# Patient Record
Sex: MALE | Race: WHITE | NOT HISPANIC OR LATINO | Employment: FULL TIME | URBAN - NONMETROPOLITAN AREA
[De-identification: names, ages, dates, MRNs, and addresses within clinical notes are randomized per-mention and may not be internally consistent; named-entity substitution may affect disease eponyms.]

---

## 2017-01-09 RX ORDER — AMLODIPINE BESYLATE 2.5 MG/1
2.5 TABLET ORAL DAILY
Qty: 30 TABLET | Refills: 3 | Status: SHIPPED | OUTPATIENT
Start: 2017-01-09 | End: 2017-10-26 | Stop reason: SDUPTHER

## 2017-01-09 NOTE — TELEPHONE ENCOUNTER
[facility] pharmacy phoned stating that Margarette Abdi  Had left town.  She has been patients PCP.  The pharmacy was needing a refill on pts. Amlodipine 2.5 mg.  This was approved with 3 refills.

## 2017-03-01 ENCOUNTER — ANTICOAGULATION VISIT (OUTPATIENT)
Dept: CARDIOLOGY | Facility: CLINIC | Age: 52
End: 2017-03-01

## 2017-03-01 LAB — INR PPP: 1.8

## 2017-03-01 NOTE — PROGRESS NOTES
DX I26.99 INR 1.8 new dosing regimen to pt, states is eating salads daily, for wt loss  Dosing adjusted accordingly

## 2017-03-14 NOTE — PROGRESS NOTES
I have reviewed the notes, assessments, and/or procedures performed by nurse, I concur with her/his documentation of Edgardo Ugarte.

## 2017-04-03 ENCOUNTER — OFFICE VISIT (OUTPATIENT)
Dept: CARDIOLOGY | Facility: CLINIC | Age: 52
End: 2017-04-03

## 2017-04-03 VITALS
DIASTOLIC BLOOD PRESSURE: 94 MMHG | WEIGHT: 290 LBS | BODY MASS INDEX: 36.06 KG/M2 | HEIGHT: 75 IN | HEART RATE: 88 BPM | SYSTOLIC BLOOD PRESSURE: 124 MMHG

## 2017-04-03 DIAGNOSIS — R06.00 DYSPNEA, UNSPECIFIED TYPE: ICD-10-CM

## 2017-04-03 DIAGNOSIS — I77.810 DILATED AORTIC ROOT (HCC): ICD-10-CM

## 2017-04-03 DIAGNOSIS — I10 ESSENTIAL HYPERTENSION: Primary | ICD-10-CM

## 2017-04-03 PROBLEM — I27.82 CHRONIC PULMONARY EMBOLISM (HCC): Status: ACTIVE | Noted: 2017-04-03

## 2017-04-03 PROCEDURE — 99213 OFFICE O/P EST LOW 20 MIN: CPT | Performed by: INTERNAL MEDICINE

## 2017-04-03 RX ORDER — ERGOCALCIFEROL 1.25 MG/1
50000 CAPSULE ORAL WEEKLY
COMMUNITY
End: 2017-11-09

## 2017-04-03 RX ORDER — WARFARIN SODIUM 7.5 MG/1
7.5 TABLET ORAL DAILY
COMMUNITY
End: 2017-04-03 | Stop reason: ALTCHOICE

## 2017-04-03 NOTE — PROGRESS NOTES
Kenyettapooja Qasim Ugarte  51 y.o. male    04/03/2017  1. Essential hypertension    2. Dyspnea, unspecified type    3. Dilated aortic root        History of Present Illness : Routine follow-up      Her stated years old patient who admitted to [facility] eighth of March 2015 for evaluation shortness breath and dizzy.  Patient noted to have markedly abnormal d-dimer.  Patient injured  his right leg week prior to that.  CT coronary angiogram confirmed the bilateral pulmonary embolism.  Echocardiographic study was done.  Abnormal left and a systolic function with a mildly dilated aortic root diameter 4.0 and mildly dilated left atrium.  The patient on chronic or oral anticoagulations since that.  After discussion with the patient decided to go to discontinue warfarin.  He will continue amlodipine.  Patient denies orthopnea, PND, nausea, vomiting, diarrhea.  Denies any palpitation or fluttering.  Denies any dyspnea on exertion.             SUBJECTIVE    Allergies   Allergen Reactions   • Penicillins          Past Medical History:   Diagnosis Date   • Hypertension    • Personal history of PE (pulmonary embolism)          Past Surgical History:   Procedure Laterality Date   • CHOLECYSTECTOMY           History reviewed. No pertinent family history.      Social History     Social History   • Marital status: Single     Spouse name: N/A   • Number of children: N/A   • Years of education: N/A     Occupational History   • Not on file.     Social History Main Topics   • Smoking status: Never Smoker   • Smokeless tobacco: Never Used   • Alcohol use Yes      Comment: socially   • Drug use: No   • Sexual activity: Defer     Other Topics Concern   • Not on file     Social History Narrative   • No narrative on file         Current Outpatient Prescriptions   Medication Sig Dispense Refill   • amLODIPine (NORVASC) 2.5 MG tablet Take 1 tablet by mouth Daily. 30 tablet 3   • vitamin D (ERGOCALCIFEROL) 17875 UNITS capsule capsule Take  "50,000 Units by mouth 1 (One) Time Per Week.       No current facility-administered medications for this visit.          OBJECTIVE    /94  Pulse 88  Ht 75\" (190.5 cm)  Wt 290 lb (132 kg)  BMI 36.25 kg/m2        Review of Systems     Constitutional:  Denies recent weight loss, weight gain, fever or chills, no change in exercise tolerance     HENT:  Denies any hearing loss, epistaxis, hoarseness, or difficulty speaking.     Eyes: Wears eyeglasses or contact lenses     Respiratory:  Denies dyspnea with exertion,no cough, wheezing, or hemoptysis.     Cardiovascular: Negative for palpations, chest pain, orthopnea, PND, peripheral edema, syncope, or claudication.     Gastrointestinal:  Denies change in bowel habits, dyspepsia, ulcer disease, hematochezia, or melena.     Endocrine: Negative for cold intolerance, heat intolerance, polydipsia, polyphagia and polyuria. Denies any history of weight change, heat/cold intolerance, polydipsia, polyuria     Genitourinary: Negative.      Musculoskeletal: Denies any history of arthritic symptoms or back problems     Skin:  Denies any change in hair or nails, rashes, or skin lesions.     Allergic/Immunologic: Negative.  Negative for environmental allergies, food allergies and immunocompromised state.     Neurological:  Denies any history of recurrent headaches, strokes, TIA, or seizure disorder.     Hematological: Denies any food allergies, seasonal allergies, bleeding disorders, or lymphadenopathy.     Psychiatric/Behavioral: Denies any history of depression, substance abuse, or change in cognitive function.         Physical Exam     Constitutional: Cooperative, alert and oriented, well-developed, well-nourished, in no acute distress.     HENT:   Head: Normocephalic, normal hair patterns, no masses or tenderness.  Ears, Nose, and Throat: No gross abnormalities. No pallor or cyanosis. Dentition good.   Eyes: EOMS intact, PERRL, conjunctivae and lids unremarkable. " Fundoscopic exam and visual fields not performed.   Neck: No palpable masses or adenopathy, no thyromegaly, no JVD, carotid pulses are full and equal bilaterally and without  Bruits.     Cardiovascular: Regular rhythm, S1 and S2 normal, no S3 or S4. Apical impulse not displaced. No murmurs, gallops, or rubs detected.     Pulmonary/Chest: Chest: normal symmetry, no tenderness to palpation, normal respiratory excursion, no intercostal retraction, no use of accessory muscles.            Pulmonary: Normal breath sounds. No rales or ronchi.    Abdominal: Abdomen soft, bowel sounds normoactive, no masses, no hepatosplenomegaly, non-tender, no bruits.     Musculoskeletal: No deformities, clubbing, cyanosis, erythema, or edema observed. There are no spinal abnormalities noted. Normal muscle strength and tone. Pulses full and equal in all extremities, no bruits auscultated.     Neurological: No gross motor or sensory deficits noted, affect appropriate, oriented to time, person, place.     Skin: Warm and dry to the touch, no apparent skin lesions or masses noted.     Psychiatric: He has a normal mood and affect. His behavior is normal. Judgment and thought content normal.         Procedures      Lab Results   Component Value Date    WBC 10.9 (H) 05/31/2016    HGB 15.2 05/31/2016    HCT 42.3 05/31/2016    MCV 89.1 05/31/2016     05/31/2016     Lab Results   Component Value Date    GLUCOSE 104 (H) 05/31/2016    BUN 15 05/31/2016    CREATININE 0.8 05/31/2016    CO2 22 05/31/2016    CALCIUM 9.0 05/31/2016    ALBUMIN 4.3 05/31/2016    AST 38 05/31/2016    ALT 55 05/31/2016     No results found for: CHOL  No results found for: TRIG  No results found for: HDL  No results found for: LDLCALC  No results found for: LDL  No results found for: HDLLDLRATIO  No components found for: CHOLHDL  No results found for: HGBA1C  Lab Results   Component Value Date    TSH 1.06 05/31/2016           ASSESSMENT AND PLAN  History of pulmonary  embolism March 2015 #2 hypertension #3 and mildly dilated aortic root    Clinically there is no sign of any acute cardiac decompensation based on the clinical history physical finding.  No evidence of active ischemia.  He had a pulmonary embolism in 2015 after injury to his right leg.  After discussion with the patient decided to discontinue warfarin.  He will continue amlodipine for hypertension with good blood pressure control.  Will arrange an echocardiographic study given the mildly dilated aortic root with the last echo in March 2015.  We'll see him back in this 8 month to one year R depends on patient clinical condition    Edgardo was seen today for follow-up.    Diagnoses and all orders for this visit:    Essential hypertension  -     Adult Transthoracic Echo Complete    Dyspnea, unspecified type  -     Adult Transthoracic Echo Complete    Dilated aortic root  -     Adult Transthoracic Echo Complete        Whit Little MD  4/3/2017  4:06 PM

## 2017-07-10 ENCOUNTER — HOSPITAL ENCOUNTER (INPATIENT)
Facility: HOSPITAL | Age: 52
LOS: 8 days | Discharge: HOME | End: 2017-07-18
Attending: EMERGENCY MEDICINE | Admitting: INTERNAL MEDICINE

## 2017-07-10 ENCOUNTER — APPOINTMENT (OUTPATIENT)
Dept: CT IMAGING | Facility: HOSPITAL | Age: 52
End: 2017-07-10

## 2017-07-10 ENCOUNTER — APPOINTMENT (OUTPATIENT)
Dept: ULTRASOUND IMAGING | Facility: HOSPITAL | Age: 52
End: 2017-07-10

## 2017-07-10 DIAGNOSIS — I26.99 PULMONARY EMBOLISM, BILATERAL (HCC): Primary | ICD-10-CM

## 2017-07-10 DIAGNOSIS — I82.401 DVT, RECURRENT, LOWER EXTREMITY, ACUTE, RIGHT (HCC): ICD-10-CM

## 2017-07-10 LAB
ALBUMIN SERPL-MCNC: 4.4 G/DL (ref 3.4–4.8)
ALBUMIN/GLOB SERPL: 1.2 G/DL (ref 1.1–1.8)
ALP SERPL-CCNC: 88 U/L (ref 38–126)
ALT SERPL W P-5'-P-CCNC: 47 U/L (ref 21–72)
ANION GAP SERPL CALCULATED.3IONS-SCNC: 14 MMOL/L (ref 5–15)
APTT PPP: 28.4 SECONDS (ref 20–40.3)
ARTERIAL PATENCY WRIST A: ABNORMAL
AST SERPL-CCNC: 32 U/L (ref 17–59)
ATMOSPHERIC PRESS: ABNORMAL MMHG
BASE EXCESS BLDA CALC-SCNC: -1 MMOL/L (ref -2.4–2.4)
BASOPHILS # BLD AUTO: 0.02 10*3/MM3 (ref 0–0.2)
BASOPHILS NFR BLD AUTO: 0.2 % (ref 0–2)
BDY SITE: ABNORMAL
BILIRUB SERPL-MCNC: 1.2 MG/DL (ref 0.2–1.3)
BUN BLD-MCNC: 15 MG/DL (ref 7–21)
BUN/CREAT SERPL: 16.7 (ref 7–25)
CA-I BLD-MCNC: 4.7 MG/DL (ref 4.5–4.9)
CALCIUM SPEC-SCNC: 9 MG/DL (ref 8.4–10.2)
CHLORIDE SERPL-SCNC: 101 MMOL/L (ref 95–110)
CO2 BLDA-SCNC: 23.6 MMOL/L (ref 23–27)
CO2 SERPL-SCNC: 23 MMOL/L (ref 22–31)
CREAT BLD-MCNC: 0.9 MG/DL (ref 0.7–1.3)
D-DIMER, QUANTITATIVE (MAD,POW, STR): 3804 NG/ML (FEU) (ref 0–470)
DEPRECATED RDW RBC AUTO: 40.3 FL (ref 35.1–43.9)
EOSINOPHIL # BLD AUTO: 0.12 10*3/MM3 (ref 0–0.7)
EOSINOPHIL NFR BLD AUTO: 1.1 % (ref 0–7)
ERYTHROCYTE [DISTWIDTH] IN BLOOD BY AUTOMATED COUNT: 12.1 % (ref 11.5–14.5)
GFR SERPL CREATININE-BSD FRML MDRD: 88 ML/MIN/1.73 (ref 56–130)
GLOBULIN UR ELPH-MCNC: 3.6 GM/DL (ref 2.3–3.5)
GLUCOSE BLD-MCNC: 98 MG/DL (ref 60–100)
GLUCOSE BLDA-MCNC: 104 MMOL/L
HCO3 BLDA-SCNC: 22.5 MMOL/L (ref 22–26)
HCT VFR BLD AUTO: 43.1 % (ref 39–49)
HCT VFR BLD CALC: 45 % (ref 40–54)
HGB BLD-MCNC: 15 G/DL (ref 13.7–17.3)
HGB BLDA-MCNC: 15.4 G/DL (ref 14–18)
HOLD SPECIMEN: NORMAL
IMM GRANULOCYTES # BLD: 0.04 10*3/MM3 (ref 0–0.02)
IMM GRANULOCYTES NFR BLD: 0.4 % (ref 0–0.5)
INR PPP: 1.02 (ref 0.8–1.2)
INR PPP: 1.07 (ref 0.8–1.2)
LYMPHOCYTES # BLD AUTO: 2.91 10*3/MM3 (ref 0.6–4.2)
LYMPHOCYTES NFR BLD AUTO: 27.6 % (ref 10–50)
MCH RBC QN AUTO: 31.6 PG (ref 26.5–34)
MCHC RBC AUTO-ENTMCNC: 34.8 G/DL (ref 31.5–36.3)
MCV RBC AUTO: 90.9 FL (ref 80–98)
MODALITY: ABNORMAL
MONOCYTES # BLD AUTO: 1.36 10*3/MM3 (ref 0–0.9)
MONOCYTES NFR BLD AUTO: 12.9 % (ref 0–12)
NEUTROPHILS # BLD AUTO: 6.11 10*3/MM3 (ref 2–8.6)
NEUTROPHILS NFR BLD AUTO: 57.8 % (ref 37–80)
PCO2 BLDA: 34.4 MM HG (ref 35–45)
PH BLDA: 7.43 PH UNITS (ref 7.35–7.45)
PLATELET # BLD AUTO: 179 10*3/MM3 (ref 150–450)
PMV BLD AUTO: 11.1 FL (ref 8–12)
PO2 BLDA: 69.3 MM HG (ref 80–105)
POTASSIUM BLD-SCNC: 4 MMOL/L (ref 3.5–5.1)
POTASSIUM BLDA-SCNC: 4.05 MMOL/L (ref 3.6–4.9)
PROT SERPL-MCNC: 8 G/DL (ref 6.3–8.6)
PROTHROMBIN TIME: 13.3 SECONDS (ref 11.1–15.3)
PROTHROMBIN TIME: 13.8 SECONDS (ref 11.1–15.3)
RBC # BLD AUTO: 4.74 10*6/MM3 (ref 4.37–5.74)
SAO2 % BLDCOA: 95 % (ref 94–100)
SODIUM BLD-SCNC: 138 MMOL/L (ref 137–145)
SODIUM BLDA-SCNC: 137.2 MMOL/L (ref 138–146)
TROPONIN I SERPL-MCNC: <0.012 NG/ML
WBC NRBC COR # BLD: 10.56 10*3/MM3 (ref 3.2–9.8)

## 2017-07-10 PROCEDURE — 85730 THROMBOPLASTIN TIME PARTIAL: CPT | Performed by: PHYSICIAN ASSISTANT

## 2017-07-10 PROCEDURE — 0 IOPAMIDOL PER 1 ML: Performed by: PHYSICIAN ASSISTANT

## 2017-07-10 PROCEDURE — 71275 CT ANGIOGRAPHY CHEST: CPT

## 2017-07-10 PROCEDURE — 82803 BLOOD GASES ANY COMBINATION: CPT | Performed by: PHYSICIAN ASSISTANT

## 2017-07-10 PROCEDURE — 85025 COMPLETE CBC W/AUTO DIFF WBC: CPT | Performed by: PHYSICIAN ASSISTANT

## 2017-07-10 PROCEDURE — 25010000002 ENOXAPARIN PER 10 MG: Performed by: PHYSICIAN ASSISTANT

## 2017-07-10 PROCEDURE — 25010000002 MORPHINE SULFATE (PF) 2 MG/ML SOLUTION: Performed by: INTERNAL MEDICINE

## 2017-07-10 PROCEDURE — 85379 FIBRIN DEGRADATION QUANT: CPT | Performed by: PHYSICIAN ASSISTANT

## 2017-07-10 PROCEDURE — 25010000002 HYDROMORPHONE PER 4 MG: Performed by: EMERGENCY MEDICINE

## 2017-07-10 PROCEDURE — 85610 PROTHROMBIN TIME: CPT | Performed by: NURSE PRACTITIONER

## 2017-07-10 PROCEDURE — 25010000002 ONDANSETRON PER 1 MG: Performed by: EMERGENCY MEDICINE

## 2017-07-10 PROCEDURE — 85610 PROTHROMBIN TIME: CPT | Performed by: PHYSICIAN ASSISTANT

## 2017-07-10 PROCEDURE — 80053 COMPREHEN METABOLIC PANEL: CPT | Performed by: PHYSICIAN ASSISTANT

## 2017-07-10 PROCEDURE — 93010 ELECTROCARDIOGRAM REPORT: CPT | Performed by: INTERNAL MEDICINE

## 2017-07-10 PROCEDURE — 93005 ELECTROCARDIOGRAM TRACING: CPT | Performed by: PHYSICIAN ASSISTANT

## 2017-07-10 PROCEDURE — 99284 EMERGENCY DEPT VISIT MOD MDM: CPT

## 2017-07-10 PROCEDURE — 84484 ASSAY OF TROPONIN QUANT: CPT | Performed by: PHYSICIAN ASSISTANT

## 2017-07-10 PROCEDURE — 93971 EXTREMITY STUDY: CPT

## 2017-07-10 RX ORDER — SODIUM CHLORIDE 0.9 % (FLUSH) 0.9 %
1-10 SYRINGE (ML) INJECTION AS NEEDED
Status: DISCONTINUED | OUTPATIENT
Start: 2017-07-10 | End: 2017-07-18 | Stop reason: HOSPADM

## 2017-07-10 RX ORDER — MORPHINE SULFATE 2 MG/ML
2 INJECTION, SOLUTION INTRAMUSCULAR; INTRAVENOUS EVERY 4 HOURS PRN
Status: DISCONTINUED | OUTPATIENT
Start: 2017-07-10 | End: 2017-07-18 | Stop reason: HOSPADM

## 2017-07-10 RX ORDER — ONDANSETRON 2 MG/ML
4 INJECTION INTRAMUSCULAR; INTRAVENOUS ONCE
Status: COMPLETED | OUTPATIENT
Start: 2017-07-10 | End: 2017-07-10

## 2017-07-10 RX ORDER — WARFARIN SODIUM 5 MG/1
5 TABLET ORAL
Status: DISCONTINUED | OUTPATIENT
Start: 2017-07-10 | End: 2017-07-13

## 2017-07-10 RX ORDER — ASPIRIN 81 MG/1
324 TABLET, CHEWABLE ORAL ONCE
Status: COMPLETED | OUTPATIENT
Start: 2017-07-10 | End: 2017-07-10

## 2017-07-10 RX ORDER — ACETAMINOPHEN 325 MG/1
650 TABLET ORAL EVERY 4 HOURS PRN
Status: DISCONTINUED | OUTPATIENT
Start: 2017-07-10 | End: 2017-07-18 | Stop reason: HOSPADM

## 2017-07-10 RX ORDER — SODIUM CHLORIDE 0.9 % (FLUSH) 0.9 %
10 SYRINGE (ML) INJECTION AS NEEDED
Status: DISCONTINUED | OUTPATIENT
Start: 2017-07-10 | End: 2017-07-18 | Stop reason: HOSPADM

## 2017-07-10 RX ORDER — AMLODIPINE BESYLATE 2.5 MG/1
2.5 TABLET ORAL DAILY
Status: DISCONTINUED | OUTPATIENT
Start: 2017-07-11 | End: 2017-07-18 | Stop reason: HOSPADM

## 2017-07-10 RX ORDER — MELATONIN
5000 DAILY
Status: DISCONTINUED | OUTPATIENT
Start: 2017-07-11 | End: 2017-07-18 | Stop reason: HOSPADM

## 2017-07-10 RX ORDER — SODIUM CHLORIDE 9 MG/ML
100 INJECTION, SOLUTION INTRAVENOUS CONTINUOUS
Status: DISCONTINUED | OUTPATIENT
Start: 2017-07-10 | End: 2017-07-14

## 2017-07-10 RX ADMIN — WARFARIN SODIUM 5 MG: 5 TABLET ORAL at 22:10

## 2017-07-10 RX ADMIN — ENOXAPARIN SODIUM 130 MG: 150 INJECTION SUBCUTANEOUS at 18:44

## 2017-07-10 RX ADMIN — MORPHINE SULFATE 2 MG: 2 INJECTION, SOLUTION INTRAMUSCULAR; INTRAVENOUS at 22:19

## 2017-07-10 RX ADMIN — ASPIRIN 81 MG 324 MG: 81 TABLET ORAL at 18:45

## 2017-07-10 RX ADMIN — ONDANSETRON 4 MG: 2 INJECTION INTRAMUSCULAR; INTRAVENOUS at 19:41

## 2017-07-10 RX ADMIN — ACETAMINOPHEN 650 MG: 325 TABLET ORAL at 21:25

## 2017-07-10 RX ADMIN — IOPAMIDOL 72 ML: 755 INJECTION, SOLUTION INTRAVENOUS at 18:26

## 2017-07-10 RX ADMIN — SODIUM CHLORIDE 100 ML/HR: 900 INJECTION, SOLUTION INTRAVENOUS at 21:25

## 2017-07-10 RX ADMIN — HYDROMORPHONE HYDROCHLORIDE 1 MG: 1 INJECTION, SOLUTION INTRAMUSCULAR; INTRAVENOUS; SUBCUTANEOUS at 19:38

## 2017-07-11 ENCOUNTER — APPOINTMENT (OUTPATIENT)
Dept: CARDIOLOGY | Facility: HOSPITAL | Age: 52
End: 2017-07-11

## 2017-07-11 LAB
ANION GAP SERPL CALCULATED.3IONS-SCNC: 12 MMOL/L (ref 5–15)
BASOPHILS # BLD AUTO: 0.01 10*3/MM3 (ref 0–0.2)
BASOPHILS NFR BLD AUTO: 0.1 % (ref 0–2)
BH CV ECHO MEAS - ACS: 2.6 CM
BH CV ECHO MEAS - AO MAX PG (FULL): 3.7 MMHG
BH CV ECHO MEAS - AO MAX PG: 10.5 MMHG
BH CV ECHO MEAS - AO MEAN PG (FULL): 2.6 MMHG
BH CV ECHO MEAS - AO MEAN PG: 5.9 MMHG
BH CV ECHO MEAS - AO ROOT AREA: 10.2 CM^2
BH CV ECHO MEAS - AO ROOT DIAM: 3.6 CM
BH CV ECHO MEAS - AO V2 MAX: 161.9 CM/SEC
BH CV ECHO MEAS - AO V2 MEAN: 114.5 CM/SEC
BH CV ECHO MEAS - AO V2 VTI: 29 CM
BH CV ECHO MEAS - AVA(I,A): 4.3 CM^2
BH CV ECHO MEAS - AVA(I,D): 4.3 CM^2
BH CV ECHO MEAS - AVA(V,A): 4.3 CM^2
BH CV ECHO MEAS - AVA(V,D): 4.3 CM^2
BH CV ECHO MEAS - EDV(CUBED): 97.8 ML
BH CV ECHO MEAS - EDV(TEICH): 97.7 ML
BH CV ECHO MEAS - EF(CUBED): 80.9 %
BH CV ECHO MEAS - EF(TEICH): 73.5 %
BH CV ECHO MEAS - ESV(CUBED): 18.7 ML
BH CV ECHO MEAS - ESV(TEICH): 25.9 ML
BH CV ECHO MEAS - FS: 42.4 %
BH CV ECHO MEAS - IVS/LVPW: 0.75
BH CV ECHO MEAS - IVSD: 0.85 CM
BH CV ECHO MEAS - LV MASS(C)D: 158.4 GRAMS
BH CV ECHO MEAS - LV MAX PG: 6.8 MMHG
BH CV ECHO MEAS - LV MEAN PG: 3.4 MMHG
BH CV ECHO MEAS - LV V1 MAX: 130 CM/SEC
BH CV ECHO MEAS - LV V1 MEAN: 84.2 CM/SEC
BH CV ECHO MEAS - LV V1 VTI: 23.4 CM
BH CV ECHO MEAS - LVIDD: 4.6 CM
BH CV ECHO MEAS - LVIDS: 2.7 CM
BH CV ECHO MEAS - LVOT AREA (M): 5.3 CM^2
BH CV ECHO MEAS - LVOT AREA: 5.4 CM^2
BH CV ECHO MEAS - LVOT DIAM: 2.6 CM
BH CV ECHO MEAS - LVPWD: 1.1 CM
BH CV ECHO MEAS - MV A MAX VEL: 84.2 CM/SEC
BH CV ECHO MEAS - MV DEC SLOPE: 346.6 CM/SEC^2
BH CV ECHO MEAS - MV E MAX VEL: 103.4 CM/SEC
BH CV ECHO MEAS - MV E/A: 1.2
BH CV ECHO MEAS - MV P1/2T MAX VEL: 102.5 CM/SEC
BH CV ECHO MEAS - MV P1/2T: 86.6 MSEC
BH CV ECHO MEAS - MVA P1/2T LCG: 2.1 CM^2
BH CV ECHO MEAS - MVA(P1/2T): 2.5 CM^2
BH CV ECHO MEAS - PA MAX PG: 2.8 MMHG
BH CV ECHO MEAS - PA V2 MAX: 83.1 CM/SEC
BH CV ECHO MEAS - RAP SYSTOLE: 10 MMHG
BH CV ECHO MEAS - RVSP: 37.2 MMHG
BH CV ECHO MEAS - SV(AO): 297.2 ML
BH CV ECHO MEAS - SV(CUBED): 79.1 ML
BH CV ECHO MEAS - SV(LVOT): 126.2 ML
BH CV ECHO MEAS - SV(TEICH): 71.8 ML
BH CV ECHO MEAS - TR MAX VEL: 260.6 CM/SEC
BUN BLD-MCNC: 12 MG/DL (ref 7–21)
BUN/CREAT SERPL: 14.1 (ref 7–25)
CALCIUM SPEC-SCNC: 8.3 MG/DL (ref 8.4–10.2)
CHLORIDE SERPL-SCNC: 103 MMOL/L (ref 95–110)
CO2 SERPL-SCNC: 23 MMOL/L (ref 22–31)
CREAT BLD-MCNC: 0.85 MG/DL (ref 0.7–1.3)
DEPRECATED RDW RBC AUTO: 40.6 FL (ref 35.1–43.9)
EOSINOPHIL # BLD AUTO: 0.1 10*3/MM3 (ref 0–0.7)
EOSINOPHIL NFR BLD AUTO: 1.1 % (ref 0–7)
ERYTHROCYTE [DISTWIDTH] IN BLOOD BY AUTOMATED COUNT: 12.1 % (ref 11.5–14.5)
GFR SERPL CREATININE-BSD FRML MDRD: 94 ML/MIN/1.73 (ref 60–130)
GLUCOSE BLD-MCNC: 99 MG/DL (ref 60–100)
HCT VFR BLD AUTO: 41.1 % (ref 39–49)
HGB BLD-MCNC: 14.1 G/DL (ref 13.7–17.3)
IMM GRANULOCYTES # BLD: 0.02 10*3/MM3 (ref 0–0.02)
IMM GRANULOCYTES NFR BLD: 0.2 % (ref 0–0.5)
INR PPP: 1.12 (ref 0.8–1.2)
LV EF 2D ECHO EST: 60 %
LYMPHOCYTES # BLD AUTO: 2.54 10*3/MM3 (ref 0.6–4.2)
LYMPHOCYTES NFR BLD AUTO: 27 % (ref 10–50)
MCH RBC QN AUTO: 31.3 PG (ref 26.5–34)
MCHC RBC AUTO-ENTMCNC: 34.3 G/DL (ref 31.5–36.3)
MCV RBC AUTO: 91.3 FL (ref 80–98)
MONOCYTES # BLD AUTO: 1.49 10*3/MM3 (ref 0–0.9)
MONOCYTES NFR BLD AUTO: 15.9 % (ref 0–12)
NEUTROPHILS # BLD AUTO: 5.24 10*3/MM3 (ref 2–8.6)
NEUTROPHILS NFR BLD AUTO: 55.7 % (ref 37–80)
PLATELET # BLD AUTO: 181 10*3/MM3 (ref 150–450)
PMV BLD AUTO: 11.2 FL (ref 8–12)
POTASSIUM BLD-SCNC: 4 MMOL/L (ref 3.5–5.1)
PROTHROMBIN TIME: 14.3 SECONDS (ref 11.1–15.3)
RBC # BLD AUTO: 4.5 10*6/MM3 (ref 4.37–5.74)
SODIUM BLD-SCNC: 138 MMOL/L (ref 137–145)
WBC NRBC COR # BLD: 9.4 10*3/MM3 (ref 3.2–9.8)

## 2017-07-11 PROCEDURE — 93306 TTE W/DOPPLER COMPLETE: CPT | Performed by: INTERNAL MEDICINE

## 2017-07-11 PROCEDURE — 25010000002 MORPHINE SULFATE (PF) 2 MG/ML SOLUTION: Performed by: INTERNAL MEDICINE

## 2017-07-11 PROCEDURE — 25010000002 ENOXAPARIN PER 10 MG: Performed by: NURSE PRACTITIONER

## 2017-07-11 PROCEDURE — 85610 PROTHROMBIN TIME: CPT | Performed by: NURSE PRACTITIONER

## 2017-07-11 PROCEDURE — 93306 TTE W/DOPPLER COMPLETE: CPT

## 2017-07-11 PROCEDURE — 80048 BASIC METABOLIC PNL TOTAL CA: CPT | Performed by: NURSE PRACTITIONER

## 2017-07-11 PROCEDURE — 85025 COMPLETE CBC W/AUTO DIFF WBC: CPT | Performed by: NURSE PRACTITIONER

## 2017-07-11 RX ORDER — HYDROCODONE BITARTRATE AND ACETAMINOPHEN 7.5; 325 MG/1; MG/1
1 TABLET ORAL EVERY 4 HOURS PRN
Status: DISCONTINUED | OUTPATIENT
Start: 2017-07-11 | End: 2017-07-18 | Stop reason: HOSPADM

## 2017-07-11 RX ORDER — HYDROCODONE BITARTRATE AND ACETAMINOPHEN 7.5; 325 MG/1; MG/1
1 TABLET ORAL EVERY 6 HOURS PRN
Status: DISCONTINUED | OUTPATIENT
Start: 2017-07-11 | End: 2017-07-11

## 2017-07-11 RX ADMIN — MORPHINE SULFATE 2 MG: 2 INJECTION, SOLUTION INTRAMUSCULAR; INTRAVENOUS at 19:58

## 2017-07-11 RX ADMIN — HYDROCODONE BITARTRATE AND ACETAMINOPHEN 1 TABLET: 7.5; 325 TABLET ORAL at 16:35

## 2017-07-11 RX ADMIN — AMLODIPINE BESYLATE 2.5 MG: 2.5 TABLET ORAL at 07:43

## 2017-07-11 RX ADMIN — HYDROCODONE BITARTRATE AND ACETAMINOPHEN 1 TABLET: 7.5; 325 TABLET ORAL at 04:50

## 2017-07-11 RX ADMIN — WARFARIN SODIUM 5 MG: 5 TABLET ORAL at 17:15

## 2017-07-11 RX ADMIN — SODIUM CHLORIDE 100 ML/HR: 900 INJECTION, SOLUTION INTRAVENOUS at 18:23

## 2017-07-11 RX ADMIN — MORPHINE SULFATE 2 MG: 2 INJECTION, SOLUTION INTRAMUSCULAR; INTRAVENOUS at 02:43

## 2017-07-11 RX ADMIN — ENOXAPARIN SODIUM 130 MG: 150 INJECTION SUBCUTANEOUS at 17:15

## 2017-07-11 RX ADMIN — HYDROCODONE BITARTRATE AND ACETAMINOPHEN 1 TABLET: 7.5; 325 TABLET ORAL at 22:08

## 2017-07-11 RX ADMIN — MORPHINE SULFATE 2 MG: 2 INJECTION, SOLUTION INTRAMUSCULAR; INTRAVENOUS at 07:43

## 2017-07-11 RX ADMIN — HYDROCODONE BITARTRATE AND ACETAMINOPHEN 1 TABLET: 7.5; 325 TABLET ORAL at 10:26

## 2017-07-11 RX ADMIN — VITAMIN D, TAB 1000IU (100/BT) 5000 UNITS: 25 TAB at 07:43

## 2017-07-11 NOTE — H&P
[facility] Medicine Admission      Date of Admission: 7/10/2017      Primary Care Physician: No Known Provider      Chief Complaint: Dyspnea    HPI: This is a 52-year-old patient that presents to the emergency room with complaints of right calf pain and dyspnea.  The patient has a history of previous pulmonary embolism in  and has been on Coumadin until April of this year.  He states he was having no problems, so it was discontinued by Dr. Little.      Concurrent Medical History:  has a past medical history of Hypertension and Personal history of PE (pulmonary embolism).    Past Surgical History:  has a past surgical history that includes Cholecystectomy.    Family History:  Both mother and father  of lung cancer in their late 60s and early 70s.    Social History:  reports that he has never smoked. He has never used smokeless tobacco. He reports that he drinks alcohol. He reports that he does not use illicit drugs.    Allergies:   Allergies   Allergen Reactions   • Penicillins        Medications: Scheduled Meds:   Continuous Infusions:   PRN Meds:.•  Morphine  •  Insert peripheral IV **AND** sodium chloride    Review of Systems:  Review of Systems   Constitutional: Negative.    Respiratory: Positive for shortness of breath.    Cardiovascular: Positive for leg swelling (right leg tightness with pain. ).      Otherwise complete ROS is negative except as mentioned above.    Physical Exam:   Temp:  [99.1 °F (37.3 °C)] 99.1 °F (37.3 °C)  Heart Rate:  [] 88  Resp:  [16] 16  BP: (141-150)/(93-98) 141/93  Physical Exam   Constitutional: He is oriented to person, place, and time. He appears well-developed and well-nourished.   HENT:   Head: Normocephalic and atraumatic.   Eyes: EOM are normal. Pupils are equal, round, and reactive to light.   Neck: Normal range of motion. Neck supple.   Cardiovascular: Normal rate and regular rhythm.    Pulmonary/Chest: Effort normal  and breath sounds normal.   Abdominal: Soft. Bowel sounds are normal.   Musculoskeletal: Normal range of motion.   Neurological: He is alert and oriented to person, place, and time.   Skin: Skin is warm and dry.   Psychiatric: He has a normal mood and affect.     Results Reviewed:  I have personally reviewed current lab, radiology, and data and agree with results.  Lab Results (last 24 hours)     Procedure Component Value Units Date/Time    CBC & Differential [[id number]] Collected:  07/10/17 1715    Specimen:  Blood Updated:  07/10/17 1728    Narrative:       The following orders were created for panel order CBC & Differential.  Procedure                               Abnormality         Status                     ---------                               -----------         ------                     CBC Auto Differential[[id number]]        Abnormal            Final result                 Please view results for these tests on the individual orders.    CBC Auto Differential [[id number]]  (Abnormal) Collected:  07/10/17 1715    Specimen:  Blood Updated:  07/10/17 1728     WBC 10.56 (H) 10*3/mm3      RBC 4.74 10*6/mm3      Hemoglobin 15.0 g/dL      Hematocrit 43.1 %      MCV 90.9 fL      MCH 31.6 pg      MCHC 34.8 g/dL      RDW 12.1 %      RDW-SD 40.3 fl      MPV 11.1 fL      Platelets 179 10*3/mm3      Neutrophil % 57.8 %      Lymphocyte % 27.6 %      Monocyte % 12.9 (H) %      Eosinophil % 1.1 %      Basophil % 0.2 %      Immature Grans % 0.4 %      Neutrophils, Absolute 6.11 10*3/mm3      Lymphocytes, Absolute 2.91 10*3/mm3      Monocytes, Absolute 1.36 (H) 10*3/mm3      Eosinophils, Absolute 0.12 10*3/mm3      Basophils, Absolute 0.02 10*3/mm3      Immature Grans, Absolute 0.04 (H) 10*3/mm3     Comprehensive Metabolic Panel [[id number]]  (Abnormal) Collected:  07/10/17 1716    Specimen:  Blood Updated:  07/10/17 1753     Glucose 98 mg/dL      BUN 15 mg/dL      Creatinine 0.90 mg/dL      Sodium 138 mmol/L       Potassium 4.0 mmol/L      Chloride 101 mmol/L      CO2 23.0 mmol/L      Calcium 9.0 mg/dL      Total Protein 8.0 g/dL      Albumin 4.40 g/dL      ALT (SGPT) 47 U/L      AST (SGOT) 32 U/L      Alkaline Phosphatase 88 U/L      Total Bilirubin 1.2 mg/dL      eGFR Non African Amer 88 mL/min/1.73      Globulin 3.6 (H) gm/dL      A/G Ratio 1.2 g/dL      BUN/Creatinine Ratio 16.7     Anion Gap 14.0 mmol/L     Protime-INR [[id number]]  (Normal) Collected:  07/10/17 1716    Specimen:  Blood Updated:  07/10/17 1757     Protime 13.3 Seconds      INR 1.02    Narrative:       Therapeutic range for most indications is 2.0-3.0 INR,  or 2.5-3.5 for mechanical heart valves.    aPTT [[id number]]  (Normal) Collected:  07/10/17 1716    Specimen:  Blood Updated:  07/10/17 1757     PTT 28.4 seconds     Narrative:       The recommended Heparin therapeutic range is 68-97 seconds.    D-dimer, Quantitative [[id number]]  (Abnormal) Collected:  07/10/17 1716    Specimen:  Blood Updated:  07/10/17 1757     D-Dimer, Quantitative 3804 (H) ng/mL (FEU)     Narrative:       Dimer values <500 ng/ml FEU are FDA approved as aid in diagnosis of deep venous thrombosis and pulmonary embolism.  This test should not be used in an exclusion strategy with pretest probability alone.    A recent guideline regarding diagnosis for pulmonary thomboembolism recommends an adjusted exclusion criterion of age x 10 ng/ml FEU for patients >50 years of age ([employer] Intern Med 2015; 163: 701-711).    Troponin [[id number]]  (Normal) Collected:  07/10/17 1716    Specimen:  Blood Updated:  07/10/17 1804     Troponin I <0.012 ng/mL     Blood Gas, Arterial [[id number]]  (Abnormal) Collected:  07/10/17 1843    Specimen:  Arterial Blood Updated:  07/10/17 1850     Site --      Not performed at this site.        Sunil's Test --      Not performed at this site.        pH, Arterial 7.434 pH units      pCO2, Arterial 34.4 (L) mm Hg      pO2, Arterial 69.3 (L) mm Hg      HCO3, Arterial 22.5  mmol/L      Base Excess, Arterial -1.0 mmol/L      O2 Saturation, Arterial 95.0 %      Hemoglobin, Blood Gas 15.4 g/dL      Hematocrit, Blood Gas 45.0 %      CO2 Content 23.6     Sodium, Arterial 137.2 (L) mmol/L      Potassium, Arterial 4.05 mmol/L      Glucose, Arterial 104 mmol/L      Barometric Pressure for Blood Gas -- mmHg       Not performed at this site.        Modality --      Not performed at this site.        Ionized Calcium 4.7 mg/dL     Extra Tubes [[id number]] Collected:  07/10/17 1726    Specimen:  Blood from Blood, Venous Line Updated:  07/10/17 1901    Narrative:       The following orders were created for panel order Extra Tubes.  Procedure                               Abnormality         Status                     ---------                               -----------         ------                     [facility][[id number]]                                   Final result                 Please view results for these tests on the individual orders.    [facility] [[id number]] Collected:  07/10/17 1726    Specimen:  Blood Updated:  07/10/17 1901     Extra Tube Hold for add-ons.      Auto resulted.           Imaging Results (last 24 hours)     Procedure Component Value Units Date/Time    US Venous Doppler Lower Extremity Right (duplex) [[id number]] Collected:  07/10/17 1730     Updated:  07/10/17 1815    Narrative:       .  EXAMINATION:  Ultrasound, venous, lower extremity    CLINICAL INDICATION / HISTORY:  Right lower extremity pain,  claudication   COMPARISON:  none  TECHNIQUE:  Right lower extremity                          serial axial graded compression technique                          grayscale, color flow, spectral and  Doppler     FINDINGS:    Imaged vessels:  Thigh:    - common femoral vein (CFV)    - deep femoral vein (FV) (formally called superficial femoral  vein (SFV))    - profunda femoral vein (PFV) (cephalad portion)    - popliteal vein (PV)  calf:    - posterior tibial vein (PTV)  (cephalad portion):  Noncompressibility in the cephalad portion with echogenic  thrombus, likely subacute time course    - peroneal vein (cephalad portion): Noncompressibility in a  cephalad portion with echogenic thrombus likely subacute time  course    - greater saphenous vein (GSV) (non-contiguous segments)    Unless otherwise indicated, all of the above described vessels  were freely compressible and demonstrated spontaneous and phasic  flow as well as appropriate flow with augmentation.    .    Impression:       CONCLUSION:  1. Negative examination - no evidence of deep venous thrombosis  within the femoral-popliteal system of the right lower extremity.    2. Thrombus noted in the cephalad portion of the posterior tibial  and peroneal veins, likely of subacute time course, without  evidence of propagation into the popliteal vein. Suggest short  interval follow-up (2 weeks) to document stability, and lack of  propagation centrally.        Electronically signed by:  KARMA Santamaria MD  7/10/2017 6:13  PM CDT Workstation: [id number]    CT Angiogram Chest With Contrast [[id number]] Collected:  07/10/17 1819     Updated:  07/10/17 1846    Narrative:         EXAM:  Computed Tomography with CTA         REGION:  Chest       INDICATION:    dyspnea     - rule out pulmonary embolism       CLINICAL HISTORY:  CORRELATIVE IMAGING:  None                         TECHNIQUE:     - PE / vascular protocol     - reconstructions:  axial, coronal, sagittal, obliques     - computer-generated 3D reconstructions (MIPS) were performed.     - contrast:  intravenous Isovue 370, 72 mL                                 This exam was performed according to the departmental  dose-optimization program which includes automated exposure  control, adjustment of the mA and/or kV according to patient size  and/or use of iterative reconstruction technique.         COMMENTS:    - Pulmonary arterial system:      - Main pulmonary artery trunk:   negative      - Left, right main pulmonary arteries: Distal main pulmonary  arteries, nonocclusive      - Lobar arteries: Nonocclusive involving the majority of  lobar arteries       - Segmental arteries: Multiple segmental arteries bilaterally       - Systemic vascularity (as visualized):        - Aorta:  grossly negative / normal caliber / no dissection        - roots of great vessels:  grossly negative / normal  caliber        - SVC / IVC:  grossly negative / normal caliber     - Misc (limited visualization):      - pulmonary parenchyma:  negative      - pleura:  negative      - mediastinal / delvin:  negative      - neck, inferior:  grossly wnl      - subdiaphragmatic structures:  grossly negative (limited  evaluation)       - osseous:      - misc:       .        Impression:       CONCLUSION:          1.  Bilateral pulmonary embolism involving the distal main  pulmonary arteries, and majority of lobar arteries extending to  segmental arteries.            2.  No evidence of pathology associated with the visualized  aorta.                              Critical results alert:  --------------------------  The results of examination have been personally discussed with  the referring healthcare provider, Dr. Jimena Wood, with read  back, immediately following interpretation on 7/10/17 at 19:41  hours.                                  Electronically signed by:  KARMA Santamaria MD  7/10/2017 6:45  PM CDT Workstation: [id number]                Plan:  1.  Bilateral pulmonary embolism, involving the distal main pulmonary arteries and majority of lobar arteries extending into segmental arteries:  The patient is started on Lovenox and will be bridged to Coumadin.  Patient is ordered morphine for pain.  IV fluids started.  We'll get an echo.  2.  Hypertension: Patient will be started on home medication of Norvasc.    I discussed the patients findings and my recommendations with: Patient and son      This document has been  electronically signed by SALVATORE Munson on July 10, 2017 7:45 PM

## 2017-07-11 NOTE — ED PROVIDER NOTES
Subjective   Patient is a 52 y.o. male presenting with lower extremity pain.   History provided by:  Patient   used: No    Lower Extremity Issue   Location:  Leg  Time since incident:  3 days  Injury: no    Leg location:  R lower leg  Pain details:     Quality:  Aching, cramping and throbbing    Radiates to:  Does not radiate    Severity:  Moderate    Onset quality:  Sudden    Duration:  3 days    Timing:  Constant    Progression:  Worsening  Chronicity:  New  Dislocation: no    Foreign body present:  No foreign bodies  Tetanus status:  Up to date  Prior injury to area:  No  Relieved by:  Nothing  Exacerbated by: walking.  Ineffective treatments:  None tried  Associated symptoms: no back pain, no decreased ROM, no fatigue, no fever, no itching, no muscle weakness, no neck pain, no numbness, no stiffness, no swelling and no tingling    Associated symptoms comment:  Shortness of breath    Risk factors: no concern for non-accidental trauma, no frequent fractures, no known bone disorder, no obesity and no recent illness        Review of Systems   Constitutional: Negative.  Negative for fatigue and fever.   HENT: Negative.    Eyes: Negative.    Respiratory: Positive for shortness of breath. Negative for apnea, cough, choking, chest tightness, wheezing and stridor.    Cardiovascular: Negative.    Gastrointestinal: Negative.    Endocrine: Negative.    Genitourinary: Negative.    Musculoskeletal: Negative for arthralgias, back pain, gait problem, joint swelling, myalgias, neck pain, neck stiffness and stiffness.        Pain and tenderness in right calf   Skin: Negative.  Negative for itching.   Allergic/Immunologic: Negative.    Neurological: Positive for dizziness and light-headedness. Negative for tremors, seizures, syncope, facial asymmetry, speech difficulty, weakness, numbness and headaches.   Psychiatric/Behavioral: Negative.        Past Medical History:   Diagnosis Date   • Hypertension    •  Personal history of PE (pulmonary embolism)        Allergies   Allergen Reactions   • Penicillins        Past Surgical History:   Procedure Laterality Date   • CHOLECYSTECTOMY         History reviewed. No pertinent family history.    Social History     Social History   • Marital status:      Spouse name: N/A   • Number of children: N/A   • Years of education: N/A     Social History Main Topics   • Smoking status: Never Smoker   • Smokeless tobacco: Never Used   • Alcohol use Yes      Comment: socially   • Drug use: No   • Sexual activity: Defer     Other Topics Concern   • None     Social History Narrative           Objective   Physical Exam   Constitutional: He is oriented to person, place, and time. He appears well-developed and well-nourished. No distress. He is not intubated.   HENT:   Head: Normocephalic and atraumatic.   Eyes: Conjunctivae and EOM are normal. Pupils are equal, round, and reactive to light. Right eye exhibits no discharge. Left eye exhibits no discharge. No scleral icterus.   Neck: Normal range of motion. Neck supple. No tracheal deviation present. No thyromegaly present.   Cardiovascular: Normal rate, regular rhythm, normal heart sounds and intact distal pulses.  Exam reveals no gallop and no friction rub.    No murmur heard.  Pulmonary/Chest: Effort normal and breath sounds normal. No accessory muscle usage or stridor. No apnea, no tachypnea and no bradypnea. He is not intubated. No respiratory distress. He has no decreased breath sounds. He has no wheezes. He has no rhonchi. He has no rales. He exhibits no tenderness.   No retraction use. speaks in full sentences.    Abdominal: Soft. Bowel sounds are normal. He exhibits no distension and no mass. There is no tenderness. There is no rebound and no guarding. No hernia.   Musculoskeletal: Normal range of motion. He exhibits tenderness. He exhibits no edema or deformity.        Right shoulder: He exhibits tenderness.   Pain and  tenderness in right calf region.   Lymphadenopathy:     He has no cervical adenopathy.   Neurological: He is alert and oriented to person, place, and time. He has normal reflexes. He displays normal reflexes. No cranial nerve deficit. He exhibits normal muscle tone. Coordination normal.   Skin: Skin is warm. No rash noted. He is not diaphoretic. No erythema. No pallor.   Psychiatric: He has a normal mood and affect. His behavior is normal. Judgment and thought content normal.   Nursing note and vitals reviewed.      Procedures         ED Course  ED Course    Lovenox 1mg/kg given to patient for treatment.  Spoke with Dr. Vidal, he will see the patient in the ED and admit patient to hospital.             MDM  Number of Diagnoses or Management Options  DVT, recurrent, lower extremity, acute, right:   Pulmonary embolism, bilateral:       Final diagnoses:   Pulmonary embolism, bilateral   DVT, recurrent, lower extremity, acute, right            TRINO Augustine  07/10/17 1934       TRINO Augustine  07/11/17 2473

## 2017-07-11 NOTE — PLAN OF CARE
Problem: Patient Care Overview (Adult)  Goal: Plan of Care Review  Outcome: Ongoing (interventions implemented as appropriate)    07/11/17 0451   Coping/Psychosocial Response Interventions   Plan Of Care Reviewed With patient   Patient Care Overview   Progress no change   Outcome Evaluation   Outcome Summary/Follow up Plan initial assessment. VSS. pt c/o R calf pain.       Goal: Adult Individualization and Mutuality  Outcome: Ongoing (interventions implemented as appropriate)  Goal: Discharge Needs Assessment  Outcome: Ongoing (interventions implemented as appropriate)    Problem: VTE, DVT and PE (Adult)  Goal: Signs and Symptoms of Listed Potential Problems Will be Absent or Manageable (VTE, DVT and PE)  Outcome: Ongoing (interventions implemented as appropriate)

## 2017-07-11 NOTE — PLAN OF CARE
Problem: Patient Care Overview (Adult)  Goal: Plan of Care Review  Outcome: Ongoing (interventions implemented as appropriate)    07/11/17 7945   Coping/Psychosocial Response Interventions   Plan Of Care Reviewed With patient   Patient Care Overview   Progress no change       Goal: Adult Individualization and Mutuality  Outcome: Ongoing (interventions implemented as appropriate)  Goal: Discharge Needs Assessment  Outcome: Ongoing (interventions implemented as appropriate)    Problem: VTE, DVT and PE (Adult)  Goal: Signs and Symptoms of Listed Potential Problems Will be Absent or Manageable (VTE, DVT and PE)  Outcome: Ongoing (interventions implemented as appropriate)

## 2017-07-11 NOTE — PROGRESS NOTES
"Anticoagulation by Pharmacy - Warfarin    Edgardo Ugarte is a 52 y.o.male  [Ht: 76\" (193 cm); Wt: 283 lb 14.4 oz (129 kg)] on Warfarin 5 mg PO  for indication of current bilateral PE / Hx of PE.    Goal INR: 2-3  Today's INR:   Lab Results   Component Value Date    INR 1.12 07/11/2017         Lab Results   Component Value Date    INR 1.12 07/11/2017    INR 1.07 07/10/2017    INR 1.02 07/10/2017    PROTIME 14.3 07/11/2017    PROTIME 13.8 07/10/2017    PROTIME 13.3 07/10/2017     Lab Results   Component Value Date    HGB 14.1 07/11/2017    HGB 15.0 07/10/2017    HGB 15.2 05/31/2016     Lab Results   Component Value Date    HCT 41.1 07/11/2017    HCT 43.1 07/10/2017    HCT 42.3 05/31/2016     Assessment/Plan:  Reviewed above labs. INR, 1.12 is sub-therapeutic today.  Concurrent medications include lovenox 130 mg BID (treatment dose).  See lovenox iVent.  Will continue 5 mg warfarin today due to new start warfarin.  Patient was recently taken off warfarin in April 2017.  PTA dosing was 7.5 mg MoTuThFrSaSu, 15mg on We.  Rx will continue to follow and dose adjust accordingly.       Aaron Richardson, [facility]  07/11/17 10:19 AM     "

## 2017-07-11 NOTE — PROGRESS NOTES
[facility] Medicine Services  INPATIENT PROGRESS NOTE    Length of Stay: 1  Date of Admission: 7/10/2017  Primary Care Physician: No Known Provider    Subjective   Chief Complaint:  Right calf pain     HPI:      7/11/2017:  The patient states he is having significant right calf pain that caused him to have a restless night.  He states the Norco is helping more than the morphine.  I increased the frequency of Norco from Q6 to Q4 hours.      7/10/2017:  This is a 52-year-old patient that presents to the emergency room with complaints of right calf pain and dyspnea. The patient has a history of previous pulmonary embolism in 2015 and has been on Coumadin until April of this year. He states he was having no problems, so it was discontinued by Dr. Little.     Review of Systems   Cardiovascular: Positive for leg swelling.        RLE      All pertinent negatives and positives are as above. All other systems have been reviewed and are negative unless otherwise stated.     Objective    Temp:  [97.6 °F (36.4 °C)-99.1 °F (37.3 °C)] 97.6 °F (36.4 °C)  Heart Rate:  [] 84  Resp:  [16-18] 18  BP: (132-150)/(81-98) 139/93    Physical Exam   Constitutional: He is oriented to person, place, and time. He appears well-developed and well-nourished.   HENT:   Head: Normocephalic and atraumatic.   Eyes: EOM are normal. Pupils are equal, round, and reactive to light.   Neck: Normal range of motion. Neck supple.   Cardiovascular: Normal rate and regular rhythm.    Pulmonary/Chest: Effort normal and breath sounds normal.   Abdominal: Soft. Bowel sounds are normal.   Musculoskeletal: Normal range of motion.   Neurological: He is alert and oriented to person, place, and time.   Skin: Skin is warm and dry.   Psychiatric: He has a normal mood and affect.     Results Review:  I have reviewed the labs, radiology results, and diagnostic studies.    Laboratory Data:     Results from last 7 days  Lab  Units 07/11/17  0545 07/10/17  1843 07/10/17  1716   SODIUM mmol/L 138  --  138   SODIUM, ARTERIAL mmol/L  --  137.2*  --    POTASSIUM mmol/L 4.0  --  4.0   CHLORIDE mmol/L 103  --  101   CO2 mmol/L 23.0  --  23.0   BUN mg/dL 12  --  15   CREATININE mg/dL 0.85  --  0.90   GLUCOSE mg/dL 99  --  98   GLUCOSE, ARTERIAL mmol/L  --  104  --    CALCIUM mg/dL 8.3*  --  9.0   BILIRUBIN mg/dL  --   --  1.2   ALK PHOS U/L  --   --  88   ALT (SGPT) U/L  --   --  47   AST (SGOT) U/L  --   --  32   ANION GAP mmol/L 12.0  --  14.0     Estimated Creatinine Clearance: 147.8 mL/min (by C-G formula based on Cr of 0.85).            Results from last 7 days  Lab Units 07/11/17  0545 07/10/17  1715   WBC 10*3/mm3 9.40 10.56*   HEMOGLOBIN g/dL 14.1 15.0   HEMATOCRIT % 41.1 43.1   PLATELETS 10*3/mm3 181 179       Results from last 7 days  Lab Units 07/11/17  0545 07/10/17  2043 07/10/17  1716   INR  1.12 1.07 1.02       Culture Data:   No results found for: BLOODCX  No results found for: URINECX  No results found for: RESPCX  No results found for: WOUNDCX  No results found for: STOOLCX  No components found for: BODYFLD    Radiology Data:   Imaging Results (last 24 hours)     Procedure Component Value Units Date/Time    US Venous Doppler Lower Extremity Right (duplex) [[id number]] Collected:  07/10/17 1730     Updated:  07/10/17 1815    Narrative:       .  EXAMINATION:  Ultrasound, venous, lower extremity    CLINICAL INDICATION / HISTORY:  Right lower extremity pain,  claudication   COMPARISON:  none  TECHNIQUE:  Right lower extremity                          serial axial graded compression technique                          grayscale, color flow, spectral and  Doppler     FINDINGS:    Imaged vessels:  Thigh:    - common femoral vein (CFV)    - deep femoral vein (FV) (formally called superficial femoral  vein (SFV))    - profunda femoral vein (PFV) (cephalad portion)    - popliteal vein (PV)  calf:    - posterior tibial vein (PTV) (cephalad  portion):  Noncompressibility in the cephalad portion with echogenic  thrombus, likely subacute time course    - peroneal vein (cephalad portion): Noncompressibility in a  cephalad portion with echogenic thrombus likely subacute time  course    - greater saphenous vein (GSV) (non-contiguous segments)    Unless otherwise indicated, all of the above described vessels  were freely compressible and demonstrated spontaneous and phasic  flow as well as appropriate flow with augmentation.    .    Impression:       CONCLUSION:  1. Negative examination - no evidence of deep venous thrombosis  within the femoral-popliteal system of the right lower extremity.    2. Thrombus noted in the cephalad portion of the posterior tibial  and peroneal veins, likely of subacute time course, without  evidence of propagation into the popliteal vein. Suggest short  interval follow-up (2 weeks) to document stability, and lack of  propagation centrally.        Electronically signed by:  KARMA Santamaria MD  7/10/2017 6:13  PM CDT Workstation: [email or web address]    CT Angiogram Chest With Contrast [[id number]] Collected:  07/10/17 1819     Updated:  07/10/17 1846    Narrative:         EXAM:  Computed Tomography with CTA         REGION:  Chest       INDICATION:    dyspnea     - rule out pulmonary embolism       CLINICAL HISTORY:  CORRELATIVE IMAGING:  None                         TECHNIQUE:     - PE / vascular protocol     - reconstructions:  axial, coronal, sagittal, obliques     - computer-generated 3D reconstructions (MIPS) were performed.     - contrast:  intravenous Isovue 370, 72 mL                                 This exam was performed according to the departmental  dose-optimization program which includes automated exposure  control, adjustment of the mA and/or kV according to patient size  and/or use of iterative reconstruction technique.         COMMENTS:    - Pulmonary arterial system:      - Main pulmonary artery trunk:  negative      -  Left, right main pulmonary arteries: Distal main pulmonary  arteries, nonocclusive      - Lobar arteries: Nonocclusive involving the majority of  lobar arteries       - Segmental arteries: Multiple segmental arteries bilaterally       - Systemic vascularity (as visualized):        - Aorta:  grossly negative / normal caliber / no dissection        - roots of great vessels:  grossly negative / normal  caliber        - SVC / IVC:  grossly negative / normal caliber     - Misc (limited visualization):      - pulmonary parenchyma:  negative      - pleura:  negative      - mediastinal / delvin:  negative      - neck, inferior:  grossly wnl      - subdiaphragmatic structures:  grossly negative (limited  evaluation)       - osseous:      - misc:       .        Impression:       CONCLUSION:          1.  Bilateral pulmonary embolism involving the distal main  pulmonary arteries, and majority of lobar arteries extending to  segmental arteries.            2.  No evidence of pathology associated with the visualized  aorta.                              Critical results alert:  --------------------------  The results of examination have been personally discussed with  the referring healthcare provider, Dr. Jimena Wood, with read  back, immediately following interpretation on 7/10/17 at 19:41  hours.                                  Electronically signed by:  KARMA Santamaria MD  7/10/2017 6:45  PM CDT Workstation: [id number]          I have reviewed the patient current medications.     Assessment/Plan     Plan:    1. Bilateral pulmonary embolism, involving the distal main pulmonary arteries and majority of lobar arteries extending into segmental arteries: Increased Lovenox to BID.  Increased frequency of Norco to q 4 hours due to uncontrolled pain. Echo pending. Pharmacy dosing Coumadin.  INR today 1.12.  2. Hypertension: Patient will be started on home medication of Norvasc.            Discharge Planning: I expect patient to be  discharged to Home in 1-2 days.      This document has been electronically signed by SALVATORE Munson on July 11, 2017 11:23 AM

## 2017-07-12 LAB
ANION GAP SERPL CALCULATED.3IONS-SCNC: 10 MMOL/L (ref 5–15)
BASOPHILS # BLD AUTO: 0.01 10*3/MM3 (ref 0–0.2)
BASOPHILS NFR BLD AUTO: 0.1 % (ref 0–2)
BUN BLD-MCNC: 9 MG/DL (ref 7–21)
BUN/CREAT SERPL: 11.8 (ref 7–25)
CALCIUM SPEC-SCNC: 8.2 MG/DL (ref 8.4–10.2)
CHLORIDE SERPL-SCNC: 101 MMOL/L (ref 95–110)
CO2 SERPL-SCNC: 25 MMOL/L (ref 22–31)
CREAT BLD-MCNC: 0.76 MG/DL (ref 0.7–1.3)
DEPRECATED RDW RBC AUTO: 39.8 FL (ref 35.1–43.9)
EOSINOPHIL # BLD AUTO: 0.15 10*3/MM3 (ref 0–0.7)
EOSINOPHIL NFR BLD AUTO: 1.8 % (ref 0–7)
ERYTHROCYTE [DISTWIDTH] IN BLOOD BY AUTOMATED COUNT: 12 % (ref 11.5–14.5)
GFR SERPL CREATININE-BSD FRML MDRD: 107 ML/MIN/1.73 (ref 60–130)
GLUCOSE BLD-MCNC: 90 MG/DL (ref 60–100)
HCT VFR BLD AUTO: 40.1 % (ref 39–49)
HGB BLD-MCNC: 13.8 G/DL (ref 13.7–17.3)
IMM GRANULOCYTES # BLD: 0.03 10*3/MM3 (ref 0–0.02)
IMM GRANULOCYTES NFR BLD: 0.4 % (ref 0–0.5)
INR PPP: 1.06 (ref 0.8–1.2)
LYMPHOCYTES # BLD AUTO: 2.04 10*3/MM3 (ref 0.6–4.2)
LYMPHOCYTES NFR BLD AUTO: 24.9 % (ref 10–50)
MCH RBC QN AUTO: 31.4 PG (ref 26.5–34)
MCHC RBC AUTO-ENTMCNC: 34.4 G/DL (ref 31.5–36.3)
MCV RBC AUTO: 91.1 FL (ref 80–98)
MONOCYTES # BLD AUTO: 1.25 10*3/MM3 (ref 0–0.9)
MONOCYTES NFR BLD AUTO: 15.3 % (ref 0–12)
NEUTROPHILS # BLD AUTO: 4.7 10*3/MM3 (ref 2–8.6)
NEUTROPHILS NFR BLD AUTO: 57.5 % (ref 37–80)
PLATELET # BLD AUTO: 173 10*3/MM3 (ref 150–450)
PMV BLD AUTO: 11.4 FL (ref 8–12)
POTASSIUM BLD-SCNC: 3.7 MMOL/L (ref 3.5–5.1)
PROTHROMBIN TIME: 13.7 SECONDS (ref 11.1–15.3)
RBC # BLD AUTO: 4.4 10*6/MM3 (ref 4.37–5.74)
SODIUM BLD-SCNC: 136 MMOL/L (ref 137–145)
WBC NRBC COR # BLD: 8.18 10*3/MM3 (ref 3.2–9.8)

## 2017-07-12 PROCEDURE — 25010000002 MORPHINE SULFATE (PF) 2 MG/ML SOLUTION: Performed by: INTERNAL MEDICINE

## 2017-07-12 PROCEDURE — 85025 COMPLETE CBC W/AUTO DIFF WBC: CPT | Performed by: NURSE PRACTITIONER

## 2017-07-12 PROCEDURE — 80048 BASIC METABOLIC PNL TOTAL CA: CPT | Performed by: NURSE PRACTITIONER

## 2017-07-12 PROCEDURE — 85610 PROTHROMBIN TIME: CPT | Performed by: NURSE PRACTITIONER

## 2017-07-12 PROCEDURE — 25010000002 ENOXAPARIN PER 10 MG: Performed by: NURSE PRACTITIONER

## 2017-07-12 RX ADMIN — WARFARIN SODIUM 5 MG: 5 TABLET ORAL at 17:19

## 2017-07-12 RX ADMIN — MORPHINE SULFATE 2 MG: 2 INJECTION, SOLUTION INTRAMUSCULAR; INTRAVENOUS at 00:52

## 2017-07-12 RX ADMIN — SODIUM CHLORIDE 100 ML/HR: 900 INJECTION, SOLUTION INTRAVENOUS at 03:25

## 2017-07-12 RX ADMIN — HYDROCODONE BITARTRATE AND ACETAMINOPHEN 1 TABLET: 7.5; 325 TABLET ORAL at 20:38

## 2017-07-12 RX ADMIN — VITAMIN D, TAB 1000IU (100/BT) 5000 UNITS: 25 TAB at 08:10

## 2017-07-12 RX ADMIN — ENOXAPARIN SODIUM 130 MG: 150 INJECTION SUBCUTANEOUS at 05:45

## 2017-07-12 RX ADMIN — AMLODIPINE BESYLATE 2.5 MG: 2.5 TABLET ORAL at 08:10

## 2017-07-12 RX ADMIN — HYDROCODONE BITARTRATE AND ACETAMINOPHEN 1 TABLET: 7.5; 325 TABLET ORAL at 15:00

## 2017-07-12 RX ADMIN — SODIUM CHLORIDE 100 ML/HR: 900 INJECTION, SOLUTION INTRAVENOUS at 15:00

## 2017-07-12 RX ADMIN — ENOXAPARIN SODIUM 130 MG: 150 INJECTION SUBCUTANEOUS at 17:19

## 2017-07-12 RX ADMIN — HYDROCODONE BITARTRATE AND ACETAMINOPHEN 1 TABLET: 7.5; 325 TABLET ORAL at 05:29

## 2017-07-12 NOTE — PROGRESS NOTES
"Anticoagulation by Pharmacy - Warfarin    Edgardo Ugarte is a 52 y.o.male  [Ht: 76\" (193 cm); Wt: 285 lb 9.6 oz (130 kg)] on Warfarin 5 mg PO  for indication of current bilateral PE / Hx of PE.    Goal INR: 2-3  Today's INR:   Lab Results   Component Value Date    INR 1.06 07/12/2017         Lab Results   Component Value Date    INR 1.06 07/12/2017    INR 1.12 07/11/2017    INR 1.07 07/10/2017    PROTIME 13.7 07/12/2017    PROTIME 14.3 07/11/2017    PROTIME 13.8 07/10/2017     Lab Results   Component Value Date    HGB 13.8 07/12/2017    HGB 14.1 07/11/2017    HGB 15.0 07/10/2017     Lab Results   Component Value Date    HCT 40.1 07/12/2017    HCT 41.1 07/11/2017    HCT 43.1 07/10/2017     Assessment/Plan:  Reviewed above labs. INR, 1.06 is sub-therapeutic today. Concurrent medications include lovenox 130 mg BID (treatment dose). See lovenox iVent. Will continue 5 mg warfarin to trend his restart to warfarin. Patient was recently taken off warfarin in April 2017. PTA dosing was 7.5 mg MoTuThFrSaSu, 15mg on We. Rx will continue to follow and dose adjust accordingly.       Aaron Richardson, [facility]  07/12/17 12:43 PM     "

## 2017-07-12 NOTE — PLAN OF CARE
Problem: Patient Care Overview (Adult)  Goal: Plan of Care Review  Outcome: Ongoing (interventions implemented as appropriate)    07/11/17 1656 07/11/17 2020 07/12/17 0618   Coping/Psychosocial Response Interventions   Plan Of Care Reviewed With --  patient --    Patient Care Overview   Progress no change --  --    Outcome Evaluation   Outcome Summary/Follow up Plan --  --  Pt having pain in right calf that is being controlled with PRN Uriah and Morphine, pt has no other complaints at this time, VSS, will continue to monitor.       Goal: Adult Individualization and Mutuality  Outcome: Ongoing (interventions implemented as appropriate)    Problem: VTE, DVT and PE (Adult)  Goal: Signs and Symptoms of Listed Potential Problems Will be Absent or Manageable (VTE, DVT and PE)  Outcome: Ongoing (interventions implemented as appropriate)

## 2017-07-12 NOTE — PROGRESS NOTES
[facility] Medicine Services  INPATIENT PROGRESS NOTE    Length of Stay: 2  Date of Admission: 7/10/2017  Primary Care Physician: No Known Provider    Subjective   Chief Complaint:  Right calf pain     HPI:      7/12/2017:      7/11/2017:  The patient states he is having significant right calf pain that caused him to have a restless night.  He states the Norco is helping more than the morphine.  I increased the frequency of Norco from Q6 to Q4 hours.      7/10/2017:  This is a 52-year-old patient that presents to the emergency room with complaints of right calf pain and dyspnea. The patient has a history of previous pulmonary embolism in 2015 and has been on Coumadin until April of this year. He states he was having no problems, so it was discontinued by Dr. Little.     Review of Systems   Cardiovascular: Positive for leg swelling.        RLE      All pertinent negatives and positives are as above. All other systems have been reviewed and are negative unless otherwise stated.     Objective    Temp:  [96.6 °F (35.9 °C)-98.9 °F (37.2 °C)] 97.4 °F (36.3 °C)  Heart Rate:  [72-98] 73  Resp:  [18] 18  BP: (119-156)/(65-96) 119/86    Physical Exam   Constitutional: He is oriented to person, place, and time. He appears well-developed and well-nourished.   HENT:   Head: Normocephalic and atraumatic.   Eyes: EOM are normal. Pupils are equal, round, and reactive to light.   Neck: Normal range of motion. Neck supple.   Cardiovascular: Normal rate and regular rhythm.    Pulmonary/Chest: Effort normal and breath sounds normal.   Abdominal: Soft. Bowel sounds are normal.   Musculoskeletal: Normal range of motion.   Neurological: He is alert and oriented to person, place, and time.   Skin: Skin is warm and dry.   Psychiatric: He has a normal mood and affect.     Results Review:  I have reviewed the labs, radiology results, and diagnostic studies.    Laboratory Data:     Results from last 7  days  Lab Units 07/12/17  0523 07/11/17  0545 07/10/17  1843 07/10/17  1716   SODIUM mmol/L 136* 138  --  138   SODIUM, ARTERIAL mmol/L  --   --  137.2*  --    POTASSIUM mmol/L 3.7 4.0  --  4.0   CHLORIDE mmol/L 101 103  --  101   CO2 mmol/L 25.0 23.0  --  23.0   BUN mg/dL 9 12  --  15   CREATININE mg/dL 0.76 0.85  --  0.90   GLUCOSE mg/dL 90 99  --  98   GLUCOSE, ARTERIAL mmol/L  --   --  104  --    CALCIUM mg/dL 8.2* 8.3*  --  9.0   BILIRUBIN mg/dL  --   --   --  1.2   ALK PHOS U/L  --   --   --  88   ALT (SGPT) U/L  --   --   --  47   AST (SGOT) U/L  --   --   --  32   ANION GAP mmol/L 10.0 12.0  --  14.0     Estimated Creatinine Clearance: 165.4 mL/min (by C-G formula based on Cr of 0.76).            Results from last 7 days  Lab Units 07/12/17  0523 07/11/17  0545 07/10/17  1715   WBC 10*3/mm3 8.18 9.40 10.56*   HEMOGLOBIN g/dL 13.8 14.1 15.0   HEMATOCRIT % 40.1 41.1 43.1   PLATELETS 10*3/mm3 173 181 179       Results from last 7 days  Lab Units 07/12/17  0523 07/11/17  0545 07/10/17  2043 07/10/17  1716   INR  1.06 1.12 1.07 1.02       Culture Data:   No results found for: BLOODCX  No results found for: URINECX  No results found for: RESPCX  No results found for: WOUNDCX  No results found for: STOOLCX  No components found for: BODYFLD    Radiology Data:   Imaging Results (last 24 hours)     ** No results found for the last 24 hours. **          I have reviewed the patient current medications.     Assessment/Plan     Plan:    1. Bilateral pulmonary embolism, involving the distal main pulmonary arteries and majority of lobar arteries extending into segmental arteries: Increased Lovenox to BID.  Increased frequency of Norco to q 4 hours due to uncontrolled pain. Echo pending. Pharmacy dosing Coumadin.  INR today 1.06.  2. Hypertension: Patient will be started on home medication of Norvasc.            Discharge Planning: I expect patient to be discharged to Home in 1-2 days.      This document has been  electronically signed by SALVATORE Munson on July 12, 2017 2:55 PM

## 2017-07-12 NOTE — PLAN OF CARE
Problem: Patient Care Overview (Adult)  Goal: Plan of Care Review  Outcome: Ongoing (interventions implemented as appropriate)  Goal: Adult Individualization and Mutuality  Outcome: Ongoing (interventions implemented as appropriate)  Goal: Discharge Needs Assessment  Outcome: Ongoing (interventions implemented as appropriate)    Problem: VTE, DVT and PE (Adult)  Goal: Signs and Symptoms of Listed Potential Problems Will be Absent or Manageable (VTE, DVT and PE)  Outcome: Ongoing (interventions implemented as appropriate)

## 2017-07-13 PROBLEM — I82.401 RIGHT LEG DVT (HCC): Status: ACTIVE | Noted: 2017-07-13

## 2017-07-13 LAB
ANION GAP SERPL CALCULATED.3IONS-SCNC: 11 MMOL/L (ref 5–15)
BASOPHILS # BLD AUTO: 0.02 10*3/MM3 (ref 0–0.2)
BASOPHILS NFR BLD AUTO: 0.3 % (ref 0–2)
BUN BLD-MCNC: 10 MG/DL (ref 7–21)
BUN/CREAT SERPL: 13.7 (ref 7–25)
CALCIUM SPEC-SCNC: 8.4 MG/DL (ref 8.4–10.2)
CHLORIDE SERPL-SCNC: 104 MMOL/L (ref 95–110)
CO2 SERPL-SCNC: 21 MMOL/L (ref 22–31)
CREAT BLD-MCNC: 0.73 MG/DL (ref 0.7–1.3)
DEPRECATED RDW RBC AUTO: 38.3 FL (ref 35.1–43.9)
EOSINOPHIL # BLD AUTO: 0.17 10*3/MM3 (ref 0–0.7)
EOSINOPHIL NFR BLD AUTO: 2.2 % (ref 0–7)
ERYTHROCYTE [DISTWIDTH] IN BLOOD BY AUTOMATED COUNT: 12.1 % (ref 11.5–14.5)
GFR SERPL CREATININE-BSD FRML MDRD: 112 ML/MIN/1.73 (ref 56–130)
GLUCOSE BLD-MCNC: 76 MG/DL (ref 60–100)
HCT VFR BLD AUTO: 39.2 % (ref 39–49)
HGB BLD-MCNC: 14.2 G/DL (ref 13.7–17.3)
IMM GRANULOCYTES # BLD: 0.02 10*3/MM3 (ref 0–0.02)
IMM GRANULOCYTES NFR BLD: 0.3 % (ref 0–0.5)
INR PPP: 1.11 (ref 0.8–1.2)
LYMPHOCYTES # BLD AUTO: 2.48 10*3/MM3 (ref 0.6–4.2)
LYMPHOCYTES NFR BLD AUTO: 31.9 % (ref 10–50)
MCH RBC QN AUTO: 31.8 PG (ref 26.5–34)
MCHC RBC AUTO-ENTMCNC: 36.2 G/DL (ref 31.5–36.3)
MCV RBC AUTO: 87.9 FL (ref 80–98)
MONOCYTES # BLD AUTO: 1.16 10*3/MM3 (ref 0–0.9)
MONOCYTES NFR BLD AUTO: 14.9 % (ref 0–12)
NEUTROPHILS # BLD AUTO: 3.92 10*3/MM3 (ref 2–8.6)
NEUTROPHILS NFR BLD AUTO: 50.4 % (ref 37–80)
NRBC BLD MANUAL-RTO: 0 /100 WBC (ref 0–0)
PLATELET # BLD AUTO: 217 10*3/MM3 (ref 150–450)
PMV BLD AUTO: 12.4 FL (ref 8–12)
POTASSIUM BLD-SCNC: 4.1 MMOL/L (ref 3.5–5.1)
PROTHROMBIN TIME: 14.2 SECONDS (ref 11.1–15.3)
RBC # BLD AUTO: 4.46 10*6/MM3 (ref 4.37–5.74)
SODIUM BLD-SCNC: 136 MMOL/L (ref 137–145)
WBC NRBC COR # BLD: 7.77 10*3/MM3 (ref 3.2–9.8)

## 2017-07-13 PROCEDURE — 85025 COMPLETE CBC W/AUTO DIFF WBC: CPT | Performed by: NURSE PRACTITIONER

## 2017-07-13 PROCEDURE — 80048 BASIC METABOLIC PNL TOTAL CA: CPT | Performed by: NURSE PRACTITIONER

## 2017-07-13 PROCEDURE — 85610 PROTHROMBIN TIME: CPT | Performed by: NURSE PRACTITIONER

## 2017-07-13 PROCEDURE — 25010000002 ENOXAPARIN PER 10 MG: Performed by: NURSE PRACTITIONER

## 2017-07-13 RX ORDER — WARFARIN SODIUM 10 MG/1
10 TABLET ORAL
Status: DISCONTINUED | OUTPATIENT
Start: 2017-07-13 | End: 2017-07-18 | Stop reason: HOSPADM

## 2017-07-13 RX ADMIN — HYDROCODONE BITARTRATE AND ACETAMINOPHEN 1 TABLET: 7.5; 325 TABLET ORAL at 13:16

## 2017-07-13 RX ADMIN — SODIUM CHLORIDE 100 ML/HR: 900 INJECTION, SOLUTION INTRAVENOUS at 00:46

## 2017-07-13 RX ADMIN — AMLODIPINE BESYLATE 2.5 MG: 2.5 TABLET ORAL at 08:13

## 2017-07-13 RX ADMIN — ENOXAPARIN SODIUM 130 MG: 150 INJECTION SUBCUTANEOUS at 06:15

## 2017-07-13 RX ADMIN — HYDROCODONE BITARTRATE AND ACETAMINOPHEN 1 TABLET: 7.5; 325 TABLET ORAL at 00:49

## 2017-07-13 RX ADMIN — VITAMIN D, TAB 1000IU (100/BT) 5000 UNITS: 25 TAB at 08:13

## 2017-07-13 RX ADMIN — ENOXAPARIN SODIUM 130 MG: 150 INJECTION SUBCUTANEOUS at 17:59

## 2017-07-13 RX ADMIN — SODIUM CHLORIDE 100 ML/HR: 900 INJECTION, SOLUTION INTRAVENOUS at 20:33

## 2017-07-13 RX ADMIN — WARFARIN SODIUM 10 MG: 10 TABLET ORAL at 17:06

## 2017-07-13 RX ADMIN — SODIUM CHLORIDE 100 ML/HR: 900 INJECTION, SOLUTION INTRAVENOUS at 11:00

## 2017-07-13 NOTE — PLAN OF CARE
Problem: Patient Care Overview (Adult)  Goal: Plan of Care Review  Outcome: Ongoing (interventions implemented as appropriate)    07/13/17 2620   Coping/Psychosocial Response Interventions   Plan Of Care Reviewed With patient   Patient Care Overview   Progress improving       Goal: Adult Individualization and Mutuality  Outcome: Ongoing (interventions implemented as appropriate)  Goal: Discharge Needs Assessment  Outcome: Ongoing (interventions implemented as appropriate)    Problem: VTE, DVT and PE (Adult)  Goal: Signs and Symptoms of Listed Potential Problems Will be Absent or Manageable (VTE, DVT and PE)  Outcome: Ongoing (interventions implemented as appropriate)

## 2017-07-13 NOTE — PLAN OF CARE
Problem: Patient Care Overview (Adult)  Goal: Adult Individualization and Mutuality  Outcome: Ongoing (interventions implemented as appropriate)    07/13/17 0424   Individualization   Patient Specific Preferences likes door shut while sleeping   Patient Specific Goals dissolve clot    Patient Specific Interventions medication managment        Goal: Discharge Needs Assessment  Outcome: Ongoing (interventions implemented as appropriate)    07/13/17 0424   Discharge Needs Assessment   Concerns To Be Addressed no discharge needs identified   Equipment Needed After Discharge none   Discharge Disposition still a patient   Current Health   Anticipated Changes Related to Illness none   Self-Care   Equipment Currently Used at Home none   Living Environment   Transportation Available car;family or friend will provide         Problem: VTE, DVT and PE (Adult)  Goal: Signs and Symptoms of Listed Potential Problems Will be Absent or Manageable (VTE, DVT and PE)  Outcome: Ongoing (interventions implemented as appropriate)

## 2017-07-13 NOTE — PROGRESS NOTES
"Anticoagulation by Pharmacy - Warfarin    Edgardo Ugarte is a 52 y.o.male  [Ht: 76\" (193 cm); Wt: 283 lb (128 kg)] on Warfarin 5 mg PO  for indication of current bilateral PE / Hx of PE.    Goal INR: 2-3  Today's INR:   Lab Results   Component Value Date    INR 1.11 07/13/2017         Lab Results   Component Value Date    INR 1.11 07/13/2017    INR 1.06 07/12/2017    INR 1.12 07/11/2017    PROTIME 14.2 07/13/2017    PROTIME 13.7 07/12/2017    PROTIME 14.3 07/11/2017     Lab Results   Component Value Date    HGB 14.2 07/13/2017    HGB 13.8 07/12/2017    HGB 14.1 07/11/2017     Lab Results   Component Value Date    HCT 39.2 07/13/2017    HCT 40.1 07/12/2017    HCT 41.1 07/11/2017     Assessment/Plan:  Reviewed above labs. INR is 1.11.  INR is sub therapeutic. Pt did receive dose of warfarin last night. Concurrent medications include Enoxaparin. Mohamud Novoa increased current dose of warfarin to  10 mg. Rx will continue to follow and adjust dose accordingly.  Today is day 4 of therapy.  .    Ashli Garcia [facility]  07/13/17 2:21 PM     "

## 2017-07-13 NOTE — PROGRESS NOTES
[facility] Medicine Services  INPATIENT PROGRESS NOTE    Length of Stay: 3  Date of Admission: 7/10/2017  Primary Care Physician: No Known Provider    Subjective   Chief Complaint: Right calf pain  HPI:  This is a 52 year old male with a history of DVT and PE in March 2015.  His discontinued on coumadin in April of this year.  He reported that his pain started after a 3 hour car trip and walking around during the day.  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  He is on lovenox to coumadin bridge.  Coumadin was selected after discussion with the patient due to currently having no insurance and the desire to have easy reversal with his racing hobby.      Review of Systems   Constitutional: Negative for chills and fever.   Respiratory: Negative for shortness of breath and wheezing.    Cardiovascular: Positive for leg swelling (right). Negative for chest pain and palpitations.   Gastrointestinal: Negative for abdominal pain, diarrhea, nausea and vomiting.      All pertinent negatives and positives are as above. All other systems have been reviewed and are negative unless otherwise stated.     Objective    Temp:  [97 °F (36.1 °C)-98.5 °F (36.9 °C)] 97 °F (36.1 °C)  Heart Rate:  [63-88] 88  Resp:  [18-20] 20  BP: (114-140)/(63-94) 129/82    Physical Exam   Constitutional: He is oriented to person, place, and time. He appears well-developed and well-nourished.   HENT:   Head: Normocephalic and atraumatic.   Cardiovascular: Normal rate, regular rhythm and intact distal pulses.    Pulmonary/Chest: Effort normal and breath sounds normal.   Abdominal: Soft. Bowel sounds are normal. He exhibits no distension. There is no tenderness.   Musculoskeletal: Normal range of motion. He exhibits edema (right leg).   Neurological: He is alert and oriented to person, place, and time.   Skin: Skin is warm and dry. No erythema.   Vitals reviewed.      Results  Review:  I have reviewed the labs, radiology results, and diagnostic studies.    Laboratory Data:     Results from last 7 days  Lab Units 07/13/17  0538 07/12/17  0523 07/11/17  0545  07/10/17  1716   SODIUM mmol/L 136* 136* 138  --  138   SODIUM, ARTERIAL   --   --   --   < >  --    POTASSIUM mmol/L 4.1 3.7 4.0  --  4.0   CHLORIDE mmol/L 104 101 103  --  101   CO2 mmol/L 21.0* 25.0 23.0  --  23.0   BUN mg/dL 10 9 12  --  15   CREATININE mg/dL 0.73 0.76 0.85  --  0.90   GLUCOSE mg/dL 76 90 99  --  98   GLUCOSE, ARTERIAL   --   --   --   < >  --    CALCIUM mg/dL 8.4 8.2* 8.3*  --  9.0   BILIRUBIN mg/dL  --   --   --   --  1.2   ALK PHOS U/L  --   --   --   --  88   ALT (SGPT) U/L  --   --   --   --  47   AST (SGOT) U/L  --   --   --   --  32   ANION GAP mmol/L 11.0 10.0 12.0  --  14.0   < > = values in this interval not displayed.  Estimated Creatinine Clearance: 170.5 mL/min (by C-G formula based on Cr of 0.73).            Results from last 7 days  Lab Units 07/13/17  0538 07/12/17  0523 07/11/17  0545 07/10/17  1715   WBC 10*3/mm3 7.77 8.18 9.40 10.56*   HEMOGLOBIN g/dL 14.2 13.8 14.1 15.0   HEMATOCRIT % 39.2 40.1 41.1 43.1   PLATELETS 10*3/mm3 217 173 181 179       Results from last 7 days  Lab Units 07/13/17 0538 07/12/17 0523 07/11/17 0545 07/10/17  2043 07/10/17  1716   INR  1.11 1.06 1.12 1.07 1.02       Culture Data:   No results found for: BLOODCX  No results found for: URINECX  No results found for: RESPCX  No results found for: WOUNDCX  No results found for: STOOLCX  No components found for: BODYFLD    Radiology Data:   Imaging Results (last 24 hours)     ** No results found for the last 24 hours. **          I have reviewed the patient current medications.     Assessment/Plan     Hospital Problem List     * (Principal)Pulmonary embolism, bilateral    Essential hypertension    Right leg DVT          Plan:  Lovenox to coumadin bridge  Increase coumadin to 10 mg PO tonight  Previous coumadin dose was 7.5  mg PO daily  With recurrent bilateral PE, the patient will need life long anticoagulation therapy.           This document has been electronically signed by SALVATORE Barrera on July 13, 2017 10:49 AM

## 2017-07-14 LAB
ANION GAP SERPL CALCULATED.3IONS-SCNC: 10 MMOL/L (ref 5–15)
BASOPHILS # BLD AUTO: 0.01 10*3/MM3 (ref 0–0.2)
BASOPHILS NFR BLD AUTO: 0.2 % (ref 0–2)
BUN BLD-MCNC: 9 MG/DL (ref 7–21)
BUN/CREAT SERPL: 11.4 (ref 7–25)
CALCIUM SPEC-SCNC: 8.4 MG/DL (ref 8.4–10.2)
CHLORIDE SERPL-SCNC: 104 MMOL/L (ref 95–110)
CO2 SERPL-SCNC: 25 MMOL/L (ref 22–31)
CREAT BLD-MCNC: 0.79 MG/DL (ref 0.7–1.3)
DEPRECATED RDW RBC AUTO: 38.2 FL (ref 35.1–43.9)
EOSINOPHIL # BLD AUTO: 0.15 10*3/MM3 (ref 0–0.7)
EOSINOPHIL NFR BLD AUTO: 2.4 % (ref 0–7)
ERYTHROCYTE [DISTWIDTH] IN BLOOD BY AUTOMATED COUNT: 11.7 % (ref 11.5–14.5)
GFR SERPL CREATININE-BSD FRML MDRD: 103 ML/MIN/1.73 (ref 60–130)
GLUCOSE BLD-MCNC: 98 MG/DL (ref 60–100)
HCT VFR BLD AUTO: 40.8 % (ref 39–49)
HGB BLD-MCNC: 14.1 G/DL (ref 13.7–17.3)
IMM GRANULOCYTES # BLD: 0.01 10*3/MM3 (ref 0–0.02)
IMM GRANULOCYTES NFR BLD: 0.2 % (ref 0–0.5)
INR PPP: 1.24 (ref 0.8–1.2)
LYMPHOCYTES # BLD AUTO: 2 10*3/MM3 (ref 0.6–4.2)
LYMPHOCYTES NFR BLD AUTO: 31.5 % (ref 10–50)
MCH RBC QN AUTO: 31.1 PG (ref 26.5–34)
MCHC RBC AUTO-ENTMCNC: 34.6 G/DL (ref 31.5–36.3)
MCV RBC AUTO: 89.9 FL (ref 80–98)
MONOCYTES # BLD AUTO: 0.82 10*3/MM3 (ref 0–0.9)
MONOCYTES NFR BLD AUTO: 12.9 % (ref 0–12)
NEUTROPHILS # BLD AUTO: 3.36 10*3/MM3 (ref 2–8.6)
NEUTROPHILS NFR BLD AUTO: 52.8 % (ref 37–80)
PLATELET # BLD AUTO: 219 10*3/MM3 (ref 150–450)
PMV BLD AUTO: 11.3 FL (ref 8–12)
POTASSIUM BLD-SCNC: 4.2 MMOL/L (ref 3.5–5.1)
PROTHROMBIN TIME: 15.6 SECONDS (ref 11.1–15.3)
RBC # BLD AUTO: 4.54 10*6/MM3 (ref 4.37–5.74)
SODIUM BLD-SCNC: 139 MMOL/L (ref 137–145)
WBC NRBC COR # BLD: 6.35 10*3/MM3 (ref 3.2–9.8)

## 2017-07-14 PROCEDURE — 25010000002 ENOXAPARIN PER 10 MG: Performed by: NURSE PRACTITIONER

## 2017-07-14 PROCEDURE — 85025 COMPLETE CBC W/AUTO DIFF WBC: CPT | Performed by: NURSE PRACTITIONER

## 2017-07-14 PROCEDURE — 80048 BASIC METABOLIC PNL TOTAL CA: CPT | Performed by: NURSE PRACTITIONER

## 2017-07-14 PROCEDURE — 85610 PROTHROMBIN TIME: CPT | Performed by: NURSE PRACTITIONER

## 2017-07-14 RX ADMIN — VITAMIN D, TAB 1000IU (100/BT) 5000 UNITS: 25 TAB at 08:26

## 2017-07-14 RX ADMIN — AMLODIPINE BESYLATE 2.5 MG: 2.5 TABLET ORAL at 08:26

## 2017-07-14 RX ADMIN — ENOXAPARIN SODIUM 130 MG: 150 INJECTION SUBCUTANEOUS at 17:56

## 2017-07-14 RX ADMIN — ENOXAPARIN SODIUM 130 MG: 150 INJECTION SUBCUTANEOUS at 06:12

## 2017-07-14 RX ADMIN — WARFARIN SODIUM 10 MG: 10 TABLET ORAL at 17:56

## 2017-07-14 RX ADMIN — SODIUM CHLORIDE 100 ML/HR: 900 INJECTION, SOLUTION INTRAVENOUS at 06:12

## 2017-07-14 NOTE — PROGRESS NOTES
[facility] Medicine Services  INPATIENT PROGRESS NOTE    Length of Stay: 4  Date of Admission: 7/10/2017  Primary Care Physician: No Known Provider    Subjective   Chief Complaint: Right calf pain  HPI:  This is a 52 year old male with a history of DVT and PE in March 2015.  His discontinued on coumadin in April of this year.  He reported that his pain started after a 3 hour car trip and walking around during the day.  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  He is on lovenox to coumadin bridge.  Coumadin was selected after discussion with the patient due to currently having no insurance and the desire to have easy reversal with his racing hobby.  He reports improvement in his calf pain.  No new complaints or overnight events.      Review of Systems   Constitutional: Negative for chills and fever.   Respiratory: Negative for shortness of breath and wheezing.    Cardiovascular: Positive for leg swelling (right). Negative for chest pain and palpitations.   Gastrointestinal: Negative for abdominal pain, diarrhea, nausea and vomiting.      All pertinent negatives and positives are as above. All other systems have been reviewed and are negative unless otherwise stated.     Objective    Temp:  [96.8 °F (36 °C)-99.7 °F (37.6 °C)] 98.2 °F (36.8 °C)  Heart Rate:  [63-81] 74  Resp:  [18-20] 20  BP: (124-160)/(66-93) 133/92    Physical Exam   Constitutional: He is oriented to person, place, and time. He appears well-developed and well-nourished.   HENT:   Head: Normocephalic and atraumatic.   Cardiovascular: Normal rate, regular rhythm and intact distal pulses.    Pulmonary/Chest: Effort normal and breath sounds normal.   Abdominal: Soft. Bowel sounds are normal. He exhibits no distension. There is no tenderness.   Musculoskeletal: Normal range of motion. He exhibits edema (right leg).   Neurological: He is alert and oriented to person, place, and  time.   Skin: Skin is warm and dry. No erythema.   Vitals reviewed.      Results Review:  I have reviewed the labs, radiology results, and diagnostic studies.    Laboratory Data:     Results from last 7 days  Lab Units 07/14/17  0553 07/13/17  0538 07/12/17  0523  07/10/17  1716   SODIUM mmol/L 139 136* 136*  < > 138   SODIUM, ARTERIAL   --   --   --   < >  --    POTASSIUM mmol/L 4.2 4.1 3.7  < > 4.0   CHLORIDE mmol/L 104 104 101  < > 101   CO2 mmol/L 25.0 21.0* 25.0  < > 23.0   BUN mg/dL 9 10 9  < > 15   CREATININE mg/dL 0.79 0.73 0.76  < > 0.90   GLUCOSE mg/dL 98 76 90  < > 98   GLUCOSE, ARTERIAL   --   --   --   < >  --    CALCIUM mg/dL 8.4 8.4 8.2*  < > 9.0   BILIRUBIN mg/dL  --   --   --   --  1.2   ALK PHOS U/L  --   --   --   --  88   ALT (SGPT) U/L  --   --   --   --  47   AST (SGOT) U/L  --   --   --   --  32   ANION GAP mmol/L 10.0 11.0 10.0  < > 14.0   < > = values in this interval not displayed.  Estimated Creatinine Clearance: 159.1 mL/min (by C-G formula based on Cr of 0.79).            Results from last 7 days  Lab Units 07/14/17 0553 07/13/17 0538 07/12/17 0523 07/11/17  0545 07/10/17  1715   WBC 10*3/mm3 6.35 7.77 8.18 9.40 10.56*   HEMOGLOBIN g/dL 14.1 14.2 13.8 14.1 15.0   HEMATOCRIT % 40.8 39.2 40.1 41.1 43.1   PLATELETS 10*3/mm3 219 217 173 181 179       Results from last 7 days  Lab Units 07/14/17  0553 07/13/17  0538 07/12/17  0523 07/11/17  0545 07/10/17  2043   INR  1.24* 1.11 1.06 1.12 1.07       Culture Data:   No results found for: BLOODCX  No results found for: URINECX  No results found for: RESPCX  No results found for: WOUNDCX  No results found for: STOOLCX  No components found for: BODYFLD    Radiology Data:   Imaging Results (last 24 hours)     ** No results found for the last 24 hours. **          I have reviewed the patient current medications.     Assessment/Plan     Hospital Problem List     * (Principal)Pulmonary embolism, bilateral    Essential hypertension    Right leg DVT           Plan:  Lovenox to coumadin bridge  Increase coumadin to 10 mg PO tonight, INR now trending up, continue current dose  Previous coumadin dose was 7.5 mg PO daily  With recurrent bilateral PE, the patient will need life long anticoagulation therapy.           This document has been electronically signed by SALVATORE Barrera on July 14, 2017 11:28 AM

## 2017-07-14 NOTE — PLAN OF CARE
Problem: Patient Care Overview (Adult)  Goal: Plan of Care Review  Outcome: Ongoing (interventions implemented as appropriate)    07/14/17 0454   Coping/Psychosocial Response Interventions   Plan Of Care Reviewed With patient   Patient Care Overview   Progress improving   Outcome Evaluation   Outcome Summary/Follow up Plan pt. states leg is not as tender. Not in as much pain       Goal: Adult Individualization and Mutuality    07/13/17 0424 07/14/17 0454   Individualization   Patient Specific Preferences likes door shut while sleeping --    Patient Specific Goals --  attain therapuetic INR level    Patient Specific Interventions medication managment  --        Goal: Discharge Needs Assessment  Outcome: Ongoing (interventions implemented as appropriate)    07/13/17 0424 07/14/17 0454   Discharge Needs Assessment   Concerns To Be Addressed --  no discharge needs identified   Equipment Needed After Discharge none --    Discharge Disposition --  still a patient   Current Health   Anticipated Changes Related to Illness none --    Self-Care   Equipment Currently Used at Home none --    Living Environment   Transportation Available --  car;family or friend will provide         Problem: VTE, DVT and PE (Adult)  Goal: Signs and Symptoms of Listed Potential Problems Will be Absent or Manageable (VTE, DVT and PE)  Outcome: Ongoing (interventions implemented as appropriate)    07/14/17 0454   VTE, DVT and PE   Problems Assessed (VTE, DVT, PE) all   Problems Present (VTE, DVT, PE) deep vein thrombosis

## 2017-07-14 NOTE — PROGRESS NOTES
"Anticoagulation by Pharmacy - Warfarin    Edgardo Ugarte is a 52 y.o.male  [Ht: 76\" (193 cm); Wt: 285 lb (129 kg)] on Warfarin 10 mg PO  for indication of current bilateral PE / Hx of PE.    Goal INR: 2-3    Today's INR:   Lab Results   Component Value Date    INR 1.24 (H) 07/14/2017         Lab Results   Component Value Date    INR 1.24 (H) 07/14/2017    INR 1.11 07/13/2017    INR 1.06 07/12/2017    PROTIME 15.6 (H) 07/14/2017    PROTIME 14.2 07/13/2017    PROTIME 13.7 07/12/2017     Lab Results   Component Value Date    HGB 14.1 07/14/2017    HGB 14.2 07/13/2017    HGB 13.8 07/12/2017     Lab Results   Component Value Date    HCT 40.8 07/14/2017    HCT 39.2 07/13/2017    HCT 40.1 07/12/2017     Assessment/Plan:  Reviewed above labs. INR, 1.24, is trending up, but sub-therapeutic today.  Concurrent medications include lovenox 130mg BID.  Will continue 10 mg warfarin again today due to rising trend in INR.  PTA dosing was 7.5 mg MoTuThFrSaSu, 15mg on We.  Rx will continue to follow and dose adjust accordingly.       Aaron Richardson, [facility]  07/14/17 9:32 AM     "

## 2017-07-15 LAB
ANION GAP SERPL CALCULATED.3IONS-SCNC: 11 MMOL/L (ref 5–15)
BASOPHILS # BLD AUTO: 0.01 10*3/MM3 (ref 0–0.2)
BASOPHILS NFR BLD AUTO: 0.2 % (ref 0–2)
BUN BLD-MCNC: 10 MG/DL (ref 7–21)
BUN/CREAT SERPL: 12.2 (ref 7–25)
CALCIUM SPEC-SCNC: 8.7 MG/DL (ref 8.4–10.2)
CHLORIDE SERPL-SCNC: 105 MMOL/L (ref 95–110)
CO2 SERPL-SCNC: 24 MMOL/L (ref 22–31)
CREAT BLD-MCNC: 0.82 MG/DL (ref 0.7–1.3)
DEPRECATED RDW RBC AUTO: 38.6 FL (ref 35.1–43.9)
EOSINOPHIL # BLD AUTO: 0.18 10*3/MM3 (ref 0–0.7)
EOSINOPHIL NFR BLD AUTO: 2.9 % (ref 0–7)
ERYTHROCYTE [DISTWIDTH] IN BLOOD BY AUTOMATED COUNT: 12 % (ref 11.5–14.5)
GFR SERPL CREATININE-BSD FRML MDRD: 98 ML/MIN/1.73 (ref 56–130)
GLUCOSE BLD-MCNC: 85 MG/DL (ref 60–100)
HCT VFR BLD AUTO: 42.3 % (ref 39–49)
HGB BLD-MCNC: 15 G/DL (ref 13.7–17.3)
IMM GRANULOCYTES # BLD: 0.03 10*3/MM3 (ref 0–0.02)
IMM GRANULOCYTES NFR BLD: 0.5 % (ref 0–0.5)
INR PPP: 1.47 (ref 0.8–1.2)
LYMPHOCYTES # BLD AUTO: 1.92 10*3/MM3 (ref 0.6–4.2)
LYMPHOCYTES NFR BLD AUTO: 30.9 % (ref 10–50)
MCH RBC QN AUTO: 31.3 PG (ref 26.5–34)
MCHC RBC AUTO-ENTMCNC: 35.5 G/DL (ref 31.5–36.3)
MCV RBC AUTO: 88.1 FL (ref 80–98)
MONOCYTES # BLD AUTO: 0.97 10*3/MM3 (ref 0–0.9)
MONOCYTES NFR BLD AUTO: 15.6 % (ref 0–12)
NEUTROPHILS # BLD AUTO: 3.11 10*3/MM3 (ref 2–8.6)
NEUTROPHILS NFR BLD AUTO: 49.9 % (ref 37–80)
NRBC BLD MANUAL-RTO: 0 /100 WBC (ref 0–0)
PLATELET # BLD AUTO: 303 10*3/MM3 (ref 150–450)
PMV BLD AUTO: 12.3 FL (ref 8–12)
POTASSIUM BLD-SCNC: 3.8 MMOL/L (ref 3.5–5.1)
PROTHROMBIN TIME: 17.8 SECONDS (ref 11.1–15.3)
RBC # BLD AUTO: 4.8 10*6/MM3 (ref 4.37–5.74)
SODIUM BLD-SCNC: 140 MMOL/L (ref 137–145)
WBC NRBC COR # BLD: 6.22 10*3/MM3 (ref 3.2–9.8)

## 2017-07-15 PROCEDURE — 85025 COMPLETE CBC W/AUTO DIFF WBC: CPT | Performed by: NURSE PRACTITIONER

## 2017-07-15 PROCEDURE — 85610 PROTHROMBIN TIME: CPT | Performed by: NURSE PRACTITIONER

## 2017-07-15 PROCEDURE — 25010000002 ENOXAPARIN PER 10 MG: Performed by: NURSE PRACTITIONER

## 2017-07-15 PROCEDURE — 80048 BASIC METABOLIC PNL TOTAL CA: CPT | Performed by: NURSE PRACTITIONER

## 2017-07-15 RX ADMIN — VITAMIN D, TAB 1000IU (100/BT) 5000 UNITS: 25 TAB at 10:05

## 2017-07-15 RX ADMIN — ENOXAPARIN SODIUM 130 MG: 150 INJECTION SUBCUTANEOUS at 18:18

## 2017-07-15 RX ADMIN — ENOXAPARIN SODIUM 130 MG: 150 INJECTION SUBCUTANEOUS at 06:18

## 2017-07-15 RX ADMIN — WARFARIN SODIUM 10 MG: 10 TABLET ORAL at 18:19

## 2017-07-15 RX ADMIN — AMLODIPINE BESYLATE 2.5 MG: 2.5 TABLET ORAL at 10:04

## 2017-07-15 NOTE — PROGRESS NOTES
"Anticoagulation by Pharmacy - Warfarin    Edgardo Ugarte is a 52 y.o.male  [Ht: 76\" (193 cm); Wt: 285 lb (129 kg)] on Warfarin 10 mg PO  for indication of bilateral PE/ Hx of PE.    Goal INR: 2-3  Today's INR:   Lab Results   Component Value Date    INR 1.47 (H) 07/15/2017         Lab Results   Component Value Date    INR 1.47 (H) 07/15/2017    INR 1.24 (H) 07/14/2017    INR 1.11 07/13/2017    PROTIME 17.8 (H) 07/15/2017    PROTIME 15.6 (H) 07/14/2017    PROTIME 14.2 07/13/2017     Lab Results   Component Value Date    HGB 15.0 07/15/2017    HGB 14.1 07/14/2017    HGB 14.2 07/13/2017     Lab Results   Component Value Date    HCT 42.3 07/15/2017    HCT 40.8 07/14/2017    HCT 39.2 07/13/2017     Assessment/Plan:  INR trending upwards. Concurrently receiving lovenox 130 mg sq bid. Continue warfarin 10 mg po q1800.  Chart and labs reviewed..    Lottie Enriquez RPH  07/15/17 8:54 AM     "

## 2017-07-15 NOTE — PLAN OF CARE
Problem: Patient Care Overview (Adult)  Goal: Plan of Care Review  Outcome: Ongoing (interventions implemented as appropriate)    07/15/17 1702   Coping/Psychosocial Response Interventions   Plan Of Care Reviewed With patient   Patient Care Overview   Progress improving   Outcome Evaluation   Outcome Summary/Follow up Plan waiting for inr to be therapeutic.         Problem: VTE, DVT and PE (Adult)  Goal: Signs and Symptoms of Listed Potential Problems Will be Absent or Manageable (VTE, DVT and PE)  Outcome: Ongoing (interventions implemented as appropriate)

## 2017-07-15 NOTE — PROGRESS NOTES
[facility] Medicine Services  INPATIENT PROGRESS NOTE    Length of Stay: 5  Date of Admission: 7/10/2017  Primary Care Physician: No Known Provider    Subjective   Chief Complaint: No new complaints  HPI:  This is a 52 year old male with a history of DVT and PE in March 2015.  His discontinued on coumadin in April of this year.  He reported that his pain started after a 3 hour car trip and walking around during the day.  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  He is on lovenox to coumadin bridge.  Coumadin was selected after discussion with the patient due to currently having no insurance and the desire to have easy reversal with his racing hobby.  He reports improvement in his calf pain.  Again, no new complaints or overnight events.      Review of Systems   Constitutional: Negative for chills and fever.   Respiratory: Negative for shortness of breath and wheezing.    Cardiovascular: Positive for leg swelling (right). Negative for chest pain and palpitations.   Gastrointestinal: Negative for abdominal pain, diarrhea, nausea and vomiting.      All pertinent negatives and positives are as above. All other systems have been reviewed and are negative unless otherwise stated.     Objective    Temp:  [97.4 °F (36.3 °C)-98.6 °F (37 °C)] 97.5 °F (36.4 °C)  Heart Rate:  [54-85] 77  Resp:  [18-20] 18  BP: (118-149)/(62-92) 142/91    Physical Exam   Constitutional: He is oriented to person, place, and time. He appears well-developed and well-nourished.   HENT:   Head: Normocephalic and atraumatic.   Cardiovascular: Normal rate, regular rhythm and intact distal pulses.    Pulmonary/Chest: Effort normal and breath sounds normal.   Abdominal: Soft. Bowel sounds are normal. He exhibits no distension. There is no tenderness.   Musculoskeletal: Normal range of motion. He exhibits edema (right leg).   Neurological: He is alert and oriented to person,  place, and time.   Skin: Skin is warm and dry. No erythema.   Vitals reviewed.      Results Review:  I have reviewed the labs, radiology results, and diagnostic studies.    Laboratory Data:     Results from last 7 days  Lab Units 07/15/17  0655 07/14/17  0553 07/13/17  0538  07/10/17  1716   SODIUM mmol/L 140 139 136*  < > 138   SODIUM, ARTERIAL   --   --   --   < >  --    POTASSIUM mmol/L 3.8 4.2 4.1  < > 4.0   CHLORIDE mmol/L 105 104 104  < > 101   CO2 mmol/L 24.0 25.0 21.0*  < > 23.0   BUN mg/dL 10 9 10  < > 15   CREATININE mg/dL 0.82 0.79 0.73  < > 0.90   GLUCOSE mg/dL 85 98 76  < > 98   GLUCOSE, ARTERIAL   --   --   --   < >  --    CALCIUM mg/dL 8.7 8.4 8.4  < > 9.0   BILIRUBIN mg/dL  --   --   --   --  1.2   ALK PHOS U/L  --   --   --   --  88   ALT (SGPT) U/L  --   --   --   --  47   AST (SGOT) U/L  --   --   --   --  32   ANION GAP mmol/L 11.0 10.0 11.0  < > 14.0   < > = values in this interval not displayed.  Estimated Creatinine Clearance: 153.3 mL/min (by C-G formula based on Cr of 0.82).            Results from last 7 days  Lab Units 07/15/17  0655 07/14/17  0553 07/13/17  0538 07/12/17  0523 07/11/17  0545   WBC 10*3/mm3 6.22 6.35 7.77 8.18 9.40   HEMOGLOBIN g/dL 15.0 14.1 14.2 13.8 14.1   HEMATOCRIT % 42.3 40.8 39.2 40.1 41.1   PLATELETS 10*3/mm3 303 219 217 173 181       Results from last 7 days  Lab Units 07/15/17  0655 07/14/17  0553 07/13/17  0538 07/12/17  0523 07/11/17  0545   INR  1.47* 1.24* 1.11 1.06 1.12       Culture Data:   No results found for: BLOODCX  No results found for: URINECX  No results found for: RESPCX  No results found for: WOUNDCX  No results found for: STOOLCX  No components found for: BODYFLD    Radiology Data:   Imaging Results (last 24 hours)     ** No results found for the last 24 hours. **          I have reviewed the patient current medications.     Assessment/Plan     Hospital Problem List     * (Principal)Pulmonary embolism, bilateral    Essential hypertension    Right  leg DVT          Plan:  Lovenox to coumadin bridge  Increase coumadin to 10 mg PO tonight, INR now trending up, continue current dose  Previous coumadin dose was 7.5 mg PO daily  With recurrent bilateral PE, the patient will need life long anticoagulation therapy.           This document has been electronically signed by SALVATORE Barrera on July 15, 2017 2:29 PM

## 2017-07-15 NOTE — PLAN OF CARE
Problem: VTE, DVT and PE (Adult)  Goal: Signs and Symptoms of Listed Potential Problems Will be Absent or Manageable (VTE, DVT and PE)  Outcome: Ongoing (interventions implemented as appropriate)

## 2017-07-16 LAB
INR PPP: 1.67 (ref 0.8–1.2)
PROTHROMBIN TIME: 19.7 SECONDS (ref 11.1–15.3)

## 2017-07-16 PROCEDURE — 25010000002 ENOXAPARIN PER 10 MG: Performed by: NURSE PRACTITIONER

## 2017-07-16 PROCEDURE — 85610 PROTHROMBIN TIME: CPT | Performed by: NURSE PRACTITIONER

## 2017-07-16 RX ADMIN — ENOXAPARIN SODIUM 130 MG: 150 INJECTION SUBCUTANEOUS at 06:34

## 2017-07-16 RX ADMIN — AMLODIPINE BESYLATE 2.5 MG: 2.5 TABLET ORAL at 08:50

## 2017-07-16 RX ADMIN — WARFARIN SODIUM 10 MG: 10 TABLET ORAL at 18:08

## 2017-07-16 RX ADMIN — ENOXAPARIN SODIUM 130 MG: 150 INJECTION SUBCUTANEOUS at 18:08

## 2017-07-16 RX ADMIN — VITAMIN D, TAB 1000IU (100/BT) 5000 UNITS: 25 TAB at 08:50

## 2017-07-16 NOTE — PROGRESS NOTES
"Anticoagulation by Pharmacy - Warfarin    Edgardo Ugarte is a 52 y.o.male  [Ht: 76\" (193 cm); Wt: 285 lb (129 kg)] on Warfarin 10 mg PO  for indication of PE/Hx of PE/DVT.    Goal INR: 2-3  Today's INR:   Lab Results   Component Value Date    INR 1.67 (H) 07/16/2017         Lab Results   Component Value Date    INR 1.67 (H) 07/16/2017    INR 1.47 (H) 07/15/2017    INR 1.24 (H) 07/14/2017    PROTIME 19.7 (H) 07/16/2017    PROTIME 17.8 (H) 07/15/2017    PROTIME 15.6 (H) 07/14/2017     Lab Results   Component Value Date    HGB 15.0 07/15/2017    HGB 14.1 07/14/2017    HGB 14.2 07/13/2017     Lab Results   Component Value Date    HCT 42.3 07/15/2017    HCT 40.8 07/14/2017    HCT 39.2 07/13/2017     Assessment/Plan:  INR trending upward. Chart and labs reviewed.  Continue warfarin 10 mg po daily..    Lottie Enriquez RPH  07/16/17 11:07 AM     "

## 2017-07-16 NOTE — PROGRESS NOTES
[facility] Medicine Services  INPATIENT PROGRESS NOTE    Length of Stay: 6  Date of Admission: 7/10/2017  Primary Care Physician: No Known Provider    Subjective   Chief Complaint: No new complaints  HPI:  This is a 52 year old male with a history of DVT and PE in March 2015.  His discontinued on coumadin in April of this year.  He reported that his pain started after a 3 hour car trip and walking around during the day.  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  He is on lovenox to coumadin bridge.  Coumadin was selected after discussion with the patient due to currently having no insurance and the desire to have easy reversal with his racing hobby.  Calf pain has resolved.  INR is trending up.    Review of Systems   Constitutional: Negative for chills and fever.   Respiratory: Negative for shortness of breath and wheezing.    Cardiovascular: Negative for chest pain, palpitations and leg swelling.   Gastrointestinal: Negative for abdominal pain, diarrhea, nausea and vomiting.      All pertinent negatives and positives are as above. All other systems have been reviewed and are negative unless otherwise stated.     Objective    Temp:  [97.3 °F (36.3 °C)-98.3 °F (36.8 °C)] 98.3 °F (36.8 °C)  Heart Rate:  [57-77] 75  Resp:  [18] 18  BP: (119-150)/(69-98) 139/96    Physical Exam   Constitutional: He is oriented to person, place, and time. He appears well-developed and well-nourished.   HENT:   Head: Normocephalic and atraumatic.   Cardiovascular: Normal rate, regular rhythm and intact distal pulses.    Pulmonary/Chest: Effort normal and breath sounds normal.   Abdominal: Soft. Bowel sounds are normal. He exhibits no distension. There is no tenderness.   Musculoskeletal: Normal range of motion. He exhibits no edema.   Neurological: He is alert and oriented to person, place, and time.   Skin: Skin is warm and dry. No erythema.   Vitals  reviewed.      Results Review:  I have reviewed the labs, radiology results, and diagnostic studies.    Laboratory Data:     Results from last 7 days  Lab Units 07/15/17  0655 07/14/17 0553 07/13/17  0538  07/10/17  1716   SODIUM mmol/L 140 139 136*  < > 138   SODIUM, ARTERIAL   --   --   --   < >  --    POTASSIUM mmol/L 3.8 4.2 4.1  < > 4.0   CHLORIDE mmol/L 105 104 104  < > 101   CO2 mmol/L 24.0 25.0 21.0*  < > 23.0   BUN mg/dL 10 9 10  < > 15   CREATININE mg/dL 0.82 0.79 0.73  < > 0.90   GLUCOSE mg/dL 85 98 76  < > 98   GLUCOSE, ARTERIAL   --   --   --   < >  --    CALCIUM mg/dL 8.7 8.4 8.4  < > 9.0   BILIRUBIN mg/dL  --   --   --   --  1.2   ALK PHOS U/L  --   --   --   --  88   ALT (SGPT) U/L  --   --   --   --  47   AST (SGOT) U/L  --   --   --   --  32   ANION GAP mmol/L 11.0 10.0 11.0  < > 14.0   < > = values in this interval not displayed.  Estimated Creatinine Clearance: 153.3 mL/min (by C-G formula based on Cr of 0.82).            Results from last 7 days  Lab Units 07/15/17  0655 07/14/17  0553 07/13/17  0538 07/12/17  0523 07/11/17  0545   WBC 10*3/mm3 6.22 6.35 7.77 8.18 9.40   HEMOGLOBIN g/dL 15.0 14.1 14.2 13.8 14.1   HEMATOCRIT % 42.3 40.8 39.2 40.1 41.1   PLATELETS 10*3/mm3 303 219 217 173 181       Results from last 7 days  Lab Units 07/16/17  0633 07/15/17  0655 07/14/17  0553 07/13/17  0538 07/12/17  0523   INR  1.67* 1.47* 1.24* 1.11 1.06       Culture Data:   No results found for: BLOODCX  No results found for: URINECX  No results found for: RESPCX  No results found for: WOUNDCX  No results found for: STOOLCX  No components found for: BODYFLD    Radiology Data:   Imaging Results (last 24 hours)     ** No results found for the last 24 hours. **          I have reviewed the patient current medications.     Assessment/Plan     Hospital Problem List     * (Principal)Pulmonary embolism, bilateral    Essential hypertension    Right leg DVT          Plan:  Lovenox to coumadin bridge  Increase  coumadin to 10 mg PO tonight, INR now trending up, continue current dose  Previous coumadin dose was 7.5 mg PO daily  With recurrent bilateral PE, the patient will need life long anticoagulation therapy.   No changes to current plan          This document has been electronically signed by SALVATORE Barrera on July 16, 2017 10:41 AM

## 2017-07-17 LAB
HOLD SPECIMEN: NORMAL
INR PPP: 1.96 (ref 0.8–1.2)
PROTHROMBIN TIME: 22.4 SECONDS (ref 11.1–15.3)
WHOLE BLOOD HOLD SPECIMEN: NORMAL

## 2017-07-17 PROCEDURE — 25010000002 ENOXAPARIN PER 10 MG: Performed by: NURSE PRACTITIONER

## 2017-07-17 PROCEDURE — 85610 PROTHROMBIN TIME: CPT | Performed by: NURSE PRACTITIONER

## 2017-07-17 RX ADMIN — VITAMIN D, TAB 1000IU (100/BT) 5000 UNITS: 25 TAB at 08:45

## 2017-07-17 RX ADMIN — WARFARIN SODIUM 10 MG: 10 TABLET ORAL at 18:10

## 2017-07-17 RX ADMIN — ENOXAPARIN SODIUM 130 MG: 150 INJECTION SUBCUTANEOUS at 06:34

## 2017-07-17 RX ADMIN — AMLODIPINE BESYLATE 2.5 MG: 2.5 TABLET ORAL at 08:45

## 2017-07-17 RX ADMIN — ENOXAPARIN SODIUM 130 MG: 150 INJECTION SUBCUTANEOUS at 18:10

## 2017-07-17 NOTE — PLAN OF CARE
Problem: Patient Care Overview (Adult)  Goal: Plan of Care Review  Outcome: Ongoing (interventions implemented as appropriate)    07/15/17 1702 07/17/17 0447   Coping/Psychosocial Response Interventions   Plan Of Care Reviewed With --  patient   Patient Care Overview   Progress --  improving   Outcome Evaluation   Outcome Summary/Follow up Plan waiting for inr to be therapeutic. --

## 2017-07-17 NOTE — PROGRESS NOTES
[facility] Medicine Services  INPATIENT PROGRESS NOTE    Length of Stay: 7  Date of Admission: 7/10/2017  Primary Care Physician: No Known Provider    Subjective   Chief Complaint: No new complaints  HPI:  This is a 52 year old male with a history of DVT and PE in March 2015.  His discontinued on coumadin in April of this year.  He reported that his pain started after a 3 hour car trip and walking around during the day.  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  He is on lovenox to coumadin bridge.  Coumadin was selected after discussion with the patient due to currently having no insurance and the desire to have easy reversal with his racing hobby.  Calf pain has resolved.  INR is essentially at goal.  Will continue lovenox until 2 measurements at goal.    Review of Systems   Constitutional: Negative for chills and fever.   Respiratory: Negative for shortness of breath and wheezing.    Cardiovascular: Negative for chest pain, palpitations and leg swelling.   Gastrointestinal: Negative for abdominal pain, diarrhea, nausea and vomiting.      All pertinent negatives and positives are as above. All other systems have been reviewed and are negative unless otherwise stated.     Objective    Temp:  [95.6 °F (35.3 °C)-98.2 °F (36.8 °C)] 96.1 °F (35.6 °C)  Heart Rate:  [] 78  Resp:  [18-20] 18  BP: (120-143)/(66-94) 136/77    Physical Exam   Constitutional: He is oriented to person, place, and time. He appears well-developed and well-nourished.   HENT:   Head: Normocephalic and atraumatic.   Cardiovascular: Normal rate, regular rhythm and intact distal pulses.    Pulmonary/Chest: Effort normal and breath sounds normal.   Abdominal: Soft. Bowel sounds are normal. He exhibits no distension. There is no tenderness.   Musculoskeletal: Normal range of motion. He exhibits no edema.   Neurological: He is alert and oriented to person, place, and  time.   Skin: Skin is warm and dry. No erythema.   Vitals reviewed.      Results Review:  I have reviewed the labs, radiology results, and diagnostic studies.    Laboratory Data:     Results from last 7 days  Lab Units 07/15/17  0655 07/14/17  0553 07/13/17  0538  07/10/17  1716   SODIUM mmol/L 140 139 136*  < > 138   SODIUM, ARTERIAL   --   --   --   < >  --    POTASSIUM mmol/L 3.8 4.2 4.1  < > 4.0   CHLORIDE mmol/L 105 104 104  < > 101   CO2 mmol/L 24.0 25.0 21.0*  < > 23.0   BUN mg/dL 10 9 10  < > 15   CREATININE mg/dL 0.82 0.79 0.73  < > 0.90   GLUCOSE mg/dL 85 98 76  < > 98   GLUCOSE, ARTERIAL   --   --   --   < >  --    CALCIUM mg/dL 8.7 8.4 8.4  < > 9.0   BILIRUBIN mg/dL  --   --   --   --  1.2   ALK PHOS U/L  --   --   --   --  88   ALT (SGPT) U/L  --   --   --   --  47   AST (SGOT) U/L  --   --   --   --  32   ANION GAP mmol/L 11.0 10.0 11.0  < > 14.0   < > = values in this interval not displayed.  Estimated Creatinine Clearance: 153.3 mL/min (by C-G formula based on Cr of 0.82).            Results from last 7 days  Lab Units 07/15/17  0655 07/14/17  0553 07/13/17  0538 07/12/17  0523 07/11/17  0545   WBC 10*3/mm3 6.22 6.35 7.77 8.18 9.40   HEMOGLOBIN g/dL 15.0 14.1 14.2 13.8 14.1   HEMATOCRIT % 42.3 40.8 39.2 40.1 41.1   PLATELETS 10*3/mm3 303 219 217 173 181       Results from last 7 days  Lab Units 07/17/17  0542 07/16/17  0633 07/15/17  0655 07/14/17  0553 07/13/17  0538   INR  1.96* 1.67* 1.47* 1.24* 1.11       Culture Data:   No results found for: BLOODCX  No results found for: URINECX  No results found for: RESPCX  No results found for: WOUNDCX  No results found for: STOOLCX  No components found for: BODYFLD    Radiology Data:   Imaging Results (last 24 hours)     ** No results found for the last 24 hours. **          I have reviewed the patient current medications.     Assessment/Plan     Hospital Problem List     * (Principal)Pulmonary embolism, bilateral    Essential hypertension    Right leg DVT           Plan:  Lovenox to coumadin bridge, Goal 2-3  Increase coumadin to 10 mg PO tonight, INR now trending up, continue current dose  Previous coumadin dose was 7.5 mg PO daily  With recurrent bilateral PE, the patient will need life long anticoagulation therapy.   No changes to current plan          This document has been electronically signed by SALVATORE Barrera on July 17, 2017 1:39 PM

## 2017-07-17 NOTE — PROGRESS NOTES
"Anticoagulation by Pharmacy - Warfarin    Edgardo Ugarte is a 52 y.o.male  [Ht: 76\" (193 cm); Wt: 285 lb (129 kg)] on Warfarin 10 mg PO  for indication of PE/Hx of PE/DVT    Goal INR: 2-3  Today's INR:   Lab Results   Component Value Date    INR 1.96 (H) 07/17/2017         Lab Results   Component Value Date    INR 1.96 (H) 07/17/2017    INR 1.67 (H) 07/16/2017    INR 1.47 (H) 07/15/2017    PROTIME 22.4 (H) 07/17/2017    PROTIME 19.7 (H) 07/16/2017    PROTIME 17.8 (H) 07/15/2017     Lab Results   Component Value Date    HGB 15.0 07/15/2017    HGB 14.1 07/14/2017    HGB 14.2 07/13/2017     Lab Results   Component Value Date    HCT 42.3 07/15/2017    HCT 40.8 07/14/2017    HCT 39.2 07/13/2017     Assessment/Plan:  Reviewed above labs. INR, 1.97, is sub-therapeutic today, but trending upwards.  Concurrent medications include lovenox 130 BID.  Will continue 10 mg warfarin today due to rising trend in INR.  Recommend moving back to PTA dose of 7.5 mg tomorrow due to 4 days of 10mg.  Rx will continue to follow and dose adjust accordingly.       Aaron Richardson, [facility]  07/17/17 3:10 PM     "

## 2017-07-17 NOTE — CONSULTS
"Adult Nutrition  Assessment    Patient Name:  Edgardo Ugaret  YOB: 1965  MRN: [id number]  Admit Date:  7/10/2017    Assessment Date:  7/17/2017          Reason for Assessment       07/17/17 1123    Reason for Assessment    Reason For Assessment/Visit per organizational policy;length of stay              Nutrition/Diet History       07/17/17 1123    Nutrition/Diet History    Typical Food/Fluid Intake Pt reports normal po intake pta. No hx of wt loss.               Labs/Tests/Procedures/Meds       07/17/17 1123    Labs/Tests/Procedures/Meds    Labs/Tests Review Reviewed    Medication Review Reviewed, pertinent              Estimated/Assessed Needs       07/17/17 1124    Calculation Measurements    Weight Used For Calculations 90.7 kg (200 lb)    Height Used for Calculations 1.93 m (6' 4\")    Estimated/Assessed Energy Needs    Energy Need Method Kcal/kg    kcal/kg 25    25 Kcal/Kg (kcal) 2267.98    Estimated/Assessed Protein Needs    Weight Used for Protein Calculation 90.7 kg (200 lb)    Protein (gm/kg) 0.8    0.8 Gm Protein (gm) 72.58    Estimated/Assessed Fluid Needs    Fluid Need Method RDA method    RDA Method (mL)  2200            Nutrition Prescription Ordered       07/17/17 1124    Nutrition Prescription PO    Current PO Diet Regular            Evaluation of Received Nutrient/Fluid Intake       07/17/17 1125    PO Evaluation    % PO Intake 100%            Comments:  RD consult r/t LOS. Pt admitted r/t PE, waiting for INR to be therapeutic. Pt reports good appetite/intake pta. Denies any gi distress. RD will monitor PRN.          Electronically signed by:  Di Kelly RD  07/17/17 11:26 AM  "

## 2017-07-18 VITALS
RESPIRATION RATE: 18 BRPM | BODY MASS INDEX: 34.29 KG/M2 | SYSTOLIC BLOOD PRESSURE: 131 MMHG | HEART RATE: 73 BPM | TEMPERATURE: 97.8 F | DIASTOLIC BLOOD PRESSURE: 86 MMHG | HEIGHT: 76 IN | WEIGHT: 281.6 LBS | OXYGEN SATURATION: 97 %

## 2017-07-18 LAB
INR PPP: 2.45 (ref 0.8–1.2)
PROTHROMBIN TIME: 26.8 SECONDS (ref 11.1–15.3)

## 2017-07-18 PROCEDURE — 85610 PROTHROMBIN TIME: CPT | Performed by: NURSE PRACTITIONER

## 2017-07-18 PROCEDURE — 25010000002 ENOXAPARIN PER 10 MG: Performed by: NURSE PRACTITIONER

## 2017-07-18 RX ORDER — WARFARIN SODIUM 7.5 MG/1
7.5 TABLET ORAL
Qty: 14 TABLET | Refills: 0 | Status: SHIPPED | OUTPATIENT
Start: 2017-07-18 | End: 2017-07-26 | Stop reason: SDUPTHER

## 2017-07-18 RX ADMIN — ENOXAPARIN SODIUM 130 MG: 150 INJECTION SUBCUTANEOUS at 06:16

## 2017-07-18 RX ADMIN — AMLODIPINE BESYLATE 2.5 MG: 2.5 TABLET ORAL at 08:45

## 2017-07-18 RX ADMIN — VITAMIN D, TAB 1000IU (100/BT) 5000 UNITS: 25 TAB at 08:45

## 2017-07-18 NOTE — DISCHARGE SUMMARY
"    [facility] Medicine Services  DISCHARGE SUMMARY       Date of Admission: 7/10/2017  Date of Discharge:  7/18/2017  Primary Care Physician: No Known Provider    Presenting Problem/History of Present Illness:  Pulmonary embolism, bilateral [I26.99]  DVT, recurrent, lower extremity, acute, right [I82.401]     Final Discharge Diagnoses:  Principal Problem:    Pulmonary embolism, bilateral  Active Problems:    Essential hypertension    Right leg DVT      Consults:   Consults     Date and Time Order Name Status Description    7/10/2017 1906 Hospitalist (on-call MD unless specified)            HPI/Hospital Course:  The patient is a 52 y.o. male who [facility] to [facility] with a complaint of right calf pain and dyspnea.  He was found to have a thrombus noted in the cephalad portion of the posterior tibial and peroneal veins, likely of subacute time course.  CTA PE protocol revealed bilateral pulmonary embolism. He was initiated on coumadin after discussion due to financial limitations, current lack of insurance, and reversibility as he drives race cars as a hobby.  He was bridged with lovenox to coumadin.  He has been therapeutic for 2 days.  He is stable and discharged to home to follow up with the coumadin clinic.     Condition on Discharge:  Stable    Physical Exam on Discharge:  /86 (BP Location: Left arm, Patient Position: Lying)  Pulse 73  Temp 97.8 °F (36.6 °C) (Temporal Artery )   Resp 18  Ht 76\" (193 cm)  Wt 281 lb 9.6 oz (128 kg)  SpO2 97%  BMI 34.28 kg/m2  Physical Exam   Constitutional: He is oriented to person, place, and time. He appears well-developed and well-nourished.   HENT:   Head: Normocephalic and atraumatic.   Cardiovascular: Normal rate, regular rhythm, normal heart sounds and intact distal pulses.    Pulmonary/Chest: Effort normal and breath sounds normal. No respiratory distress.   Abdominal: Soft. Bowel sounds are normal. He " exhibits no distension. There is no tenderness.   Musculoskeletal: Normal range of motion. He exhibits no edema.   Neurological: He is alert and oriented to person, place, and time.   Skin: Skin is warm and dry. No erythema.   Vitals reviewed.    Discharge Disposition:  Home or Self Care    Discharge Medications:   Edgardo Ugarte   Home Medication Instructions KAYLA:[id number]    Printed on:07/18/17 2233   Medication Information                      amLODIPine (NORVASC) 2.5 MG tablet  Take 1 tablet by mouth Daily.             vitamin D (ERGOCALCIFEROL) 19615 UNITS capsule capsule  Take 50,000 Units by mouth 1 (One) Time Per Week.             warfarin (COUMADIN) 7.5 MG tablet  Take 1 tablet by mouth Daily.                 Discharge Diet:   Diet Instructions     Diet: Regular; Thin Liquids, No Restrictions       Discharge Diet:  Regular   Fluid Consistency:  Thin Liquids, No Restrictions                 Activity at Discharge:   Activity Instructions     Activity as Tolerated                       Follow-up Appointments:   1. PCP 1 week  2. Anticoagulation clinic this week  Future Appointments  Date Time Provider Department Center   7/26/2017 3:30 PM MD JEANNETTE Castillo [facility] None   10/3/2017 3:00 PM [facility]   4/3/2018 3:45 PM MD JEANNETTE Maddox [facility] None     SALVATORE Barrera  07/18/17  11:32 AM

## 2017-07-18 NOTE — PLAN OF CARE
Problem: Patient Care Overview (Adult)  Goal: Plan of Care Review  Outcome: Ongoing (interventions implemented as appropriate)    07/18/17 0546   Coping/Psychosocial Response Interventions   Plan Of Care Reviewed With patient   Patient Care Overview   Progress improving       Goal: Adult Individualization and Mutuality  Outcome: Ongoing (interventions implemented as appropriate)  Goal: Discharge Needs Assessment  Outcome: Ongoing (interventions implemented as appropriate)    Problem: VTE, DVT and PE (Adult)  Goal: Signs and Symptoms of Listed Potential Problems Will be Absent or Manageable (VTE, DVT and PE)  Outcome: Ongoing (interventions implemented as appropriate)

## 2017-07-21 ENCOUNTER — OFFICE VISIT (OUTPATIENT)
Dept: CARDIAC SURGERY | Facility: CLINIC | Age: 52
End: 2017-07-21

## 2017-07-21 VITALS
DIASTOLIC BLOOD PRESSURE: 75 MMHG | TEMPERATURE: 97.5 F | OXYGEN SATURATION: 96 % | BODY MASS INDEX: 35.68 KG/M2 | HEIGHT: 76 IN | SYSTOLIC BLOOD PRESSURE: 122 MMHG | WEIGHT: 293 LBS | HEART RATE: 85 BPM

## 2017-07-21 DIAGNOSIS — I26.90 CHRONIC SEPTIC PULMONARY EMBOLISM WITHOUT ACUTE COR PULMONALE (HCC): Primary | ICD-10-CM

## 2017-07-21 DIAGNOSIS — I27.82 CHRONIC SEPTIC PULMONARY EMBOLISM WITHOUT ACUTE COR PULMONALE (HCC): Primary | ICD-10-CM

## 2017-07-21 DIAGNOSIS — Z79.01 LONG TERM (CURRENT) USE OF ANTICOAGULANTS: ICD-10-CM

## 2017-07-21 DIAGNOSIS — I82.4Z1 ACUTE DEEP VEIN THROMBOSIS (DVT) OF DISTAL VEIN OF RIGHT LOWER EXTREMITY (HCC): ICD-10-CM

## 2017-07-21 PROBLEM — Z71.89 ENCOUNTER FOR ANTICOAGULATION DISCUSSION AND COUNSELING: Status: ACTIVE | Noted: 2017-07-21

## 2017-07-21 PROCEDURE — 99202 OFFICE O/P NEW SF 15 MIN: CPT | Performed by: NURSE PRACTITIONER

## 2017-07-21 NOTE — PATIENT INSTRUCTIONS
Continue Coumadin 7.5 mg nightly  Notify provider if you experience excessive bleeding from the nose, cuts, gums, rectum, urinary tract. Reddish or brown urine or stool. Vomiting of blood or hemorrhoidal bleeding. If major injury occurs present to the Emergency Department.   Balance your greens

## 2017-07-26 ENCOUNTER — ANTICOAGULATION VISIT (OUTPATIENT)
Dept: CARDIAC SURGERY | Facility: CLINIC | Age: 52
End: 2017-07-26

## 2017-07-26 ENCOUNTER — OFFICE VISIT (OUTPATIENT)
Dept: FAMILY MEDICINE CLINIC | Facility: CLINIC | Age: 52
End: 2017-07-26

## 2017-07-26 VITALS
SYSTOLIC BLOOD PRESSURE: 142 MMHG | HEIGHT: 76 IN | DIASTOLIC BLOOD PRESSURE: 86 MMHG | BODY MASS INDEX: 35.07 KG/M2 | HEART RATE: 80 BPM | OXYGEN SATURATION: 98 % | WEIGHT: 288 LBS

## 2017-07-26 VITALS — DIASTOLIC BLOOD PRESSURE: 92 MMHG | SYSTOLIC BLOOD PRESSURE: 150 MMHG | HEART RATE: 85 BPM

## 2017-07-26 DIAGNOSIS — I26.99 PULMONARY EMBOLISM, BILATERAL (HCC): ICD-10-CM

## 2017-07-26 DIAGNOSIS — Z71.89 ENCOUNTER FOR ANTICOAGULATION DISCUSSION AND COUNSELING: Primary | ICD-10-CM

## 2017-07-26 DIAGNOSIS — I82.4Z1 ACUTE DEEP VEIN THROMBOSIS (DVT) OF DISTAL VEIN OF RIGHT LOWER EXTREMITY (HCC): ICD-10-CM

## 2017-07-26 DIAGNOSIS — I27.82 CHRONIC SEPTIC PULMONARY EMBOLISM WITHOUT ACUTE COR PULMONALE (HCC): ICD-10-CM

## 2017-07-26 DIAGNOSIS — I26.90 CHRONIC SEPTIC PULMONARY EMBOLISM WITHOUT ACUTE COR PULMONALE (HCC): ICD-10-CM

## 2017-07-26 DIAGNOSIS — Z79.01 LONG TERM (CURRENT) USE OF ANTICOAGULANTS: ICD-10-CM

## 2017-07-26 LAB — INR PPP: 2.6 (ref 0.9–1.1)

## 2017-07-26 PROCEDURE — 85610 PROTHROMBIN TIME: CPT | Performed by: NURSE PRACTITIONER

## 2017-07-26 PROCEDURE — 99213 OFFICE O/P EST LOW 20 MIN: CPT | Performed by: STUDENT IN AN ORGANIZED HEALTH CARE EDUCATION/TRAINING PROGRAM

## 2017-07-26 RX ORDER — WARFARIN SODIUM 7.5 MG/1
7.5 TABLET ORAL
Qty: 14 TABLET | Refills: 0 | Status: SHIPPED | OUTPATIENT
Start: 2017-07-26 | End: 2017-10-23 | Stop reason: SDUPTHER

## 2017-07-26 NOTE — PROGRESS NOTES
Patient states no med changes or bleeding problems or unexplained bruising. Patient instructed to continue current dosing schedule. Verbalizes understanding. Will recheck in 2 weeks.  Patient instructed regarding medication; results given and questions answered. Nutritional counseling given.  Dietary factors affecting therapy addressed.  Patient instructed to monitor for excessive bruising or bleeding.         This document has been electronically signed by SALVATORE Cage on July 27, 2017 8:26 AM

## 2017-07-26 NOTE — PROGRESS NOTES
Pt was referred to Coumadin Clinic following hospitalization. Will kannan new episode as this is a new provider.

## 2017-07-26 NOTE — PROGRESS NOTES
Subjective   Edgardo Ugarte is a 52 y.o. male who presents to establish care.       History of Present Illness     Patient was discharged from hospital 7 days ago. He was in there 8 days for DVT and multiple PE. He was started on coumadin. He is following up with Coumadin clinic. Last INR 2.6.   He has a history of saddle emboli 2 years ago. He was started on coumadin for 18 months. He stopped for 4 months prior to getting the DVT and PE.      Patient has hypertension. He takes amlodipine. Systolic ranges from 138/140 and diastolic 80-90. He checks it every night.         The following portions of the patient's history were reviewed and updated as appropriate: allergies, current medications, past family history, past medical history, past social history, past surgical history and problem list.    Review of Systems   Constitutional: Negative for chills, diaphoresis and fever.   HENT: Negative for sneezing and sore throat.    Eyes: Negative for pain and discharge.   Respiratory: Negative for cough and shortness of breath.    Gastrointestinal: Negative for constipation, diarrhea, nausea and vomiting.   Endocrine: Negative for cold intolerance and heat intolerance.   Genitourinary: Negative for difficulty urinating, dysuria, frequency and urgency.   Musculoskeletal: Negative for arthralgias and myalgias.   Skin: Negative for color change and pallor.   Allergic/Immunologic: Negative for environmental allergies and food allergies.   Neurological: Negative for dizziness, syncope and weakness.   Psychiatric/Behavioral: Negative for confusion and sleep disturbance.       Objective   Physical Exam   Constitutional: He is oriented to person, place, and time. He appears well-developed and well-nourished. No distress.   HENT:   Head: Normocephalic and atraumatic.   Nose: Nose normal.   Eyes: Conjunctivae and EOM are normal. Pupils are equal, round, and reactive to light.   Neck: Normal range of motion. Neck supple.    Cardiovascular: Normal rate, regular rhythm and normal heart sounds.    No murmur heard.  Pulmonary/Chest: Effort normal and breath sounds normal. No respiratory distress. He has no wheezes. He has no rales.   Abdominal: Soft. Bowel sounds are normal. He exhibits no distension. There is no tenderness. There is no guarding.   Musculoskeletal: Normal range of motion.   Neurological: He is alert and oriented to person, place, and time.   Skin: Skin is warm and dry.   Psychiatric: He has a normal mood and affect. His behavior is normal.   Vitals reviewed.        Assessment/Plan   Edgardo was seen today for dvt and establish care.    Diagnoses and all orders for this visit:    Encounter for anticoagulation discussion and counseling  -     warfarin (COUMADIN) 7.5 MG tablet; Take 1 tablet by mouth Daily.    Long term (current) use of anticoagulants  -     warfarin (COUMADIN) 7.5 MG tablet; Take 1 tablet by mouth Daily.    Chronic septic pulmonary embolism without acute cor pulmonale    Pulmonary embolism, bilateral  -     warfarin (COUMADIN) 7.5 MG tablet; Take 1 tablet by mouth Daily.    Acute deep vein thrombosis (DVT) of distal vein of right lower extremity  -     warfarin (COUMADIN) 7.5 MG tablet; Take 1 tablet by mouth Daily.        Prevention: Patient declined colonoscopy due to insurance. He also denied Tdap and Hep C screening.       This document has been electronically signed by Awa Woodall MD on July 26, 2017 4:53 PM

## 2017-07-26 NOTE — PROGRESS NOTES
Subjective   Patient ID: Edgardo Ugarte is a 52 y.o. male is here today as a new patient referred M SALVATORE Novoa for admission into anticoagulation clinic.  CC:  Denies any bleeding or unexplained bruising.  Desires admission into anticoagulation clinic.   PCP:  Bryson Little  Referring Bright     History of Present Illness  Mr Ugarte is a gentleman with recurrent PE and DVT.  He was recently hospitalized with (US)  RLE DVT with (CTA)  bilateral PE.  He previously was on anticoagulation with coumadin and tolerated it well.  He was transitioned from lovenox to coumadin.  He was discharged on 7/18/17 on 7.5 mg coumadin nightly with INR 2.45.  He has been tolerating well.  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  He is knowledgeable of balance between VIT K and coumadin.   He will now be on lifetime anticoagulation.      The following portions of the patient's history were reviewed and updated as appropriate: allergies, current medications, past family history, past medical history, past social history, past surgical history and problem list.    Current Outpatient Prescriptions:   •  amLODIPine (NORVASC) 2.5 MG tablet, Take 1 tablet by mouth Daily., Disp: 30 tablet, Rfl: 3  •  vitamin D (ERGOCALCIFEROL) 27424 UNITS capsule capsule, Take 50,000 Units by mouth 1 (One) Time Per Week., Disp: , Rfl:   •  warfarin (COUMADIN) 7.5 MG tablet, Take 1 tablet by mouth Daily., Disp: 14 tablet, Rfl: 0    Review of Systems   Constitution: Negative for chills, decreased appetite, weakness and malaise/fatigue.        Denies use of power tools, electric knives, chain saws, or conttact sports.  Shaves with safety razor.  Aware to use soft tooth brush and waxed floss.  No recent falls.  Denies:  GIB, GUB, or Seizures.  Drives in race cars. Is aware of risk of MVA and injuries.     HENT: Negative for headaches, hearing loss, hoarse voice, nosebleeds and stridor.    Eyes: Negative for visual disturbance.    Cardiovascular: Positive for dyspnea on exertion (Resolved ) and leg swelling (Much improved ). Negative for chest pain, claudication, cyanosis, irregular heartbeat and near-syncope.   Respiratory: Negative for cough, hemoptysis and shortness of breath.    Hematologic/Lymphatic: Does not bruise/bleed easily.   Skin: Negative for color change, dry skin, flushing and poor wound healing.   Musculoskeletal: Positive for arthritis. Negative for back pain, falls and muscle weakness.   Gastrointestinal: Negative for abdominal pain, anorexia, heartburn, hematemesis, melena and nausea.   Genitourinary: Negative for hematuria.   Neurological: Negative for brief paralysis, difficulty with concentration, focal weakness, light-headedness, numbness and paresthesias.   Psychiatric/Behavioral: Negative for altered mental status and memory loss.   Allergic/Immunologic: Negative for environmental allergies.        Objective   Physical Exam   Constitutional: He is oriented to person, place, and time. He appears well-developed and well-nourished.   HENT:   Head: Normocephalic.   Mouth/Throat: Oropharynx is clear and moist.   Eyes: Conjunctivae and EOM are normal. Pupils are equal, round, and reactive to light.   Neck: Neck supple. No JVD present. No tracheal deviation present.   Cardiovascular: Normal rate, regular rhythm, normal heart sounds and intact distal pulses.    Pulses:       Carotid pulses are 1+ on the right side, and 1+ on the left side.       Radial pulses are 2+ on the right side, and 2+ on the left side.        Dorsalis pedis pulses are 2+ on the right side, and 2+ on the left side.        Posterior tibial pulses are 2+ on the right side, and 2+ on the left side.   Pulmonary/Chest: Effort normal and breath sounds normal. No stridor. No respiratory distress. He has no wheezes.   Abdominal: Soft. Bowel sounds are normal. He exhibits no mass.   Musculoskeletal: He exhibits edema (RLE< 1+ ) and tenderness (Mild RLE ).    Neurological: He is alert and oriented to person, place, and time. No cranial nerve deficit.   Skin: Skin is warm and dry. No erythema. No pallor.   Psychiatric: Judgment normal.   Nursing note and vitals reviewed.    POC INR 2.9     Lab Results   Component Value Date    WBC 6.22 07/15/2017    HGB 15.0 07/15/2017    HCT 42.3 07/15/2017    MCV 88.1 07/15/2017     07/15/2017     Lab Results   Component Value Date    GLUCOSE 85 07/15/2017    BUN 10 07/15/2017    CREATININE 0.82 07/15/2017    EGFRIFNONA 98 07/15/2017    BCR 12.2 07/15/2017    K 3.8 07/15/2017    CO2 24.0 07/15/2017    CALCIUM 8.7 07/15/2017    ALBUMIN 4.40 07/10/2017    LABIL2 1.2 07/10/2017    AST 32 07/10/2017    ALT 47 07/10/2017           Assessment/Plan   Independent Review of Radiographic Studies:    None with today's visit     1. Chronic septic pulmonary embolism without acute cor pulmonale  Recurrent  Indication for life long anticoagulation    2. Acute deep vein thrombosis (DVT) of distal vein of right lower extremity  Recurrent  Indication for life long anticoagulation    3. Long term (current) use of anticoagulants  Lifelong anticoagulation with coumadin with target INR 2-3  Continue Coumadin 7.5 mg nightly.  Balance with food Vit K intake.  Fifteen minutes of education provided, including but not limited to medication, dosing, side effects, signs of bleeding and inappropriate brusing, control of risk, and nutritional education provided.  Verbal and written education.  Mr Ugarte is aware to seek immediate medical attention should any bleeding occur or inappropriate bruising, or extreme headache with vision changes after coughing, retching or vomiting.  Patient Education:  Prescription risks and side effects discussed, Should issues or concerns arise regarding anticoagulation therapy, please contact our services regarding further instructions,, or present to an urgent/emergent care facility for further evaluation  Patient Education  Materials provided to pt (anticoag booklet).  Pt. stated willingness to be compliant and understanding of life style restrictions and medication dosing.  Plans to continue to drive race cars and is very aware of risk involved.   No other safety concerns at  this time.  Pt demonstrated understanding of teaching and counseling  After detailed discussion regarding risks, benefits, and treatment plan.  Patient understands, agrees, and wishes to proceed with plan.    Notify provider if you experience excessive bleeding from the nose, cuts, gums, rectum, urinary tract. Reddish or brown urine or stool. Vomiting of blood or hemorrhoidal bleeding. If major injury occurs present to the Emergency Department.

## 2017-07-26 NOTE — PROGRESS NOTES
I have reviewed the notes, assessments, and/or procedures performed by Dr. Woodall, I concur with her/his documentation and assessment and plan for Edgardo Ugarte.        This document has been electronically signed by Ivelisse Pond MD on July 26, 2017 4:40 PM

## 2017-08-10 ENCOUNTER — ANTICOAGULATION VISIT (OUTPATIENT)
Dept: CARDIAC SURGERY | Facility: CLINIC | Age: 52
End: 2017-08-10

## 2017-08-10 VITALS — DIASTOLIC BLOOD PRESSURE: 102 MMHG | SYSTOLIC BLOOD PRESSURE: 153 MMHG | HEART RATE: 77 BPM

## 2017-08-10 DIAGNOSIS — I26.99 PULMONARY EMBOLISM, BILATERAL (HCC): ICD-10-CM

## 2017-08-10 DIAGNOSIS — Z79.01 LONG TERM (CURRENT) USE OF ANTICOAGULANTS: ICD-10-CM

## 2017-08-10 LAB — INR PPP: 2.7 (ref 0.9–1.1)

## 2017-08-10 PROCEDURE — 85610 PROTHROMBIN TIME: CPT | Performed by: NURSE PRACTITIONER

## 2017-08-10 NOTE — PROGRESS NOTES
PT states compliant c tx. Denies any new medications or bleeding issues. Denies any s/s of blood clot. PT instructed to continue same dose and Coumadin friendly diet. PT will be seen in 1 month. Patient instructed regarding medication; results given and questions answered. Nutritional counseling given.  Dietary factors affecting therapy addressed.  Patient instructed to monitor for excessive bruising or bleeding. Pt verbalizes understanding.           This document has been electronically signed by SALVATORE Cage on August 10, 2017 4:19 PM

## 2017-08-17 ENCOUNTER — DOCUMENTATION (OUTPATIENT)
Dept: CARDIAC SURGERY | Facility: CLINIC | Age: 52
End: 2017-08-17

## 2017-10-17 ENCOUNTER — DOCUMENTATION (OUTPATIENT)
Dept: CARDIAC SURGERY | Facility: CLINIC | Age: 52
End: 2017-10-17

## 2017-10-17 NOTE — PROGRESS NOTES
Pt called and left a message on answering machine from yesterday.  Pt stated he ran out of coumadin last night and needs a refill called to [facility].  I attempted to call pt to notify him his INR is overdue from his last CC visit of August 10th. Pt did not answer my phone calls and does not have voice mail.

## 2017-10-17 NOTE — PROGRESS NOTES
I did reach pt by phone who states he is out of town working but can return to [facility] on Monday the 23rd.  I called [facility] in [place], [place] at [phone] and authorized coumadin 7.5mg, # 7, no additional refills.

## 2017-10-23 ENCOUNTER — ANTICOAGULATION VISIT (OUTPATIENT)
Dept: CARDIAC SURGERY | Facility: CLINIC | Age: 52
End: 2017-10-23

## 2017-10-23 VITALS — DIASTOLIC BLOOD PRESSURE: 100 MMHG | HEART RATE: 80 BPM | SYSTOLIC BLOOD PRESSURE: 164 MMHG

## 2017-10-23 DIAGNOSIS — I26.99 PULMONARY EMBOLISM, BILATERAL (HCC): ICD-10-CM

## 2017-10-23 DIAGNOSIS — Z79.01 LONG TERM CURRENT USE OF ANTICOAGULANT THERAPY: ICD-10-CM

## 2017-10-23 DIAGNOSIS — I26.99 PE (PULMONARY THROMBOEMBOLISM) (HCC): ICD-10-CM

## 2017-10-23 DIAGNOSIS — Z71.89 ENCOUNTER FOR ANTICOAGULATION DISCUSSION AND COUNSELING: ICD-10-CM

## 2017-10-23 DIAGNOSIS — I82.4Z1 ACUTE DEEP VEIN THROMBOSIS (DVT) OF DISTAL VEIN OF RIGHT LOWER EXTREMITY (HCC): ICD-10-CM

## 2017-10-23 LAB — INR PPP: 2.4 (ref 0.9–1.1)

## 2017-10-23 PROCEDURE — 85610 PROTHROMBIN TIME: CPT | Performed by: NURSE PRACTITIONER

## 2017-10-23 RX ORDER — WARFARIN SODIUM 7.5 MG/1
7.5 TABLET ORAL
Qty: 35 TABLET | Refills: 0 | Status: SHIPPED | OUTPATIENT
Start: 2017-10-23 | End: 2017-11-28 | Stop reason: SDUPTHER

## 2017-10-23 NOTE — PROGRESS NOTES
Pt overdue for INR today: pt has been working out of town.    Pt states medications have not changed, no bleeding issues.  Patient instructed regarding medication; results given and questions answered. Nutritional counseling given.  Dietary factors affecting therapy addressed.  Patient instructed to monitor for excessive bruising or bleeding. Pt verbalizes.          This document has been electronically signed by SALVATORE Cage on October 23, 2017 4:08 PM

## 2017-10-25 ENCOUNTER — TELEPHONE (OUTPATIENT)
Dept: FAMILY MEDICINE CLINIC | Facility: CLINIC | Age: 52
End: 2017-10-25

## 2017-10-25 NOTE — TELEPHONE ENCOUNTER
PT CALLED NEEDING REFILLS OF HIS AMLODIPINE. PT USES [facility]. HAS APPT 11-9-17

## 2017-10-26 ENCOUNTER — TELEPHONE (OUTPATIENT)
Dept: FAMILY MEDICINE CLINIC | Facility: CLINIC | Age: 52
End: 2017-10-26

## 2017-10-26 RX ORDER — AMLODIPINE BESYLATE 2.5 MG/1
2.5 TABLET ORAL DAILY
Qty: 30 TABLET | Refills: 3 | Status: SHIPPED | OUTPATIENT
Start: 2017-10-26 | End: 2017-11-09 | Stop reason: SDUPTHER

## 2017-10-26 NOTE — TELEPHONE ENCOUNTER
PT CALLED, SAID THAT HE LEFT A MESSAGE IN 10/25 AS WELL., HE IS NEEDING AMLODIPINE.   AND WANTED TO KNOW IF IT COULD BE CALLED IN, PT HAS AN APPT COMING UP IN NOVEMBER BUT IS OUT OF THAT MED.       [facility] IS WHERE THE PT WOULD LIKE IT SENT TOO      THANKS

## 2017-11-07 ENCOUNTER — OFFICE VISIT (OUTPATIENT)
Dept: ORTHOPEDIC SURGERY | Facility: CLINIC | Age: 52
End: 2017-11-07

## 2017-11-07 VITALS — HEIGHT: 76 IN | BODY MASS INDEX: 35.31 KG/M2 | WEIGHT: 290 LBS

## 2017-11-07 DIAGNOSIS — S62.025A CLOSED NONDISPLACED FRACTURE OF MIDDLE THIRD OF SCAPHOID BONE OF LEFT WRIST, INITIAL ENCOUNTER: ICD-10-CM
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
## 2017-11-07 DIAGNOSIS — M25.532 LEFT WRIST PAIN: Primary | ICD-10-CM

## 2017-11-07 PROCEDURE — 25622 CLTX CARPL SCPHD FX W/O MNPJ: CPT | Performed by: NURSE PRACTITIONER

## 2017-11-07 PROCEDURE — 99214 OFFICE O/P EST MOD 30 MIN: CPT | Performed by: NURSE PRACTITIONER
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
## 2017-11-09 ENCOUNTER — OFFICE VISIT (OUTPATIENT)
Dept: FAMILY MEDICINE CLINIC | Facility: CLINIC | Age: 52
End: 2017-11-09

## 2017-11-09 VITALS
HEART RATE: 95 BPM | DIASTOLIC BLOOD PRESSURE: 88 MMHG | OXYGEN SATURATION: 97 % | WEIGHT: 294 LBS | HEIGHT: 76 IN | BODY MASS INDEX: 35.8 KG/M2 | SYSTOLIC BLOOD PRESSURE: 148 MMHG

## 2017-11-09 DIAGNOSIS — Z71.89 ENCOUNTER FOR ANTICOAGULATION DISCUSSION AND COUNSELING: ICD-10-CM

## 2017-11-09 DIAGNOSIS — I27.82 CHRONIC SEPTIC PULMONARY EMBOLISM WITHOUT ACUTE COR PULMONALE (HCC): ICD-10-CM

## 2017-11-09 DIAGNOSIS — I26.90 CHRONIC SEPTIC PULMONARY EMBOLISM WITHOUT ACUTE COR PULMONALE (HCC): ICD-10-CM

## 2017-11-09 DIAGNOSIS — J01.10 ACUTE FRONTAL SINUSITIS, RECURRENCE NOT SPECIFIED: Primary | ICD-10-CM

## 2017-11-09 DIAGNOSIS — I10 ESSENTIAL HYPERTENSION: ICD-10-CM
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
## 2017-11-09 PROBLEM — S62.025A CLOSED NONDISPLACED FRACTURE OF MIDDLE THIRD OF NAVICULAR BONE OF LEFT WRIST: Status: ACTIVE | Noted: 2017-11-09

## 2017-11-09 PROCEDURE — 99213 OFFICE O/P EST LOW 20 MIN: CPT | Performed by: STUDENT IN AN ORGANIZED HEALTH CARE EDUCATION/TRAINING PROGRAM

## 2017-11-09 PROCEDURE — 96372 THER/PROPH/DIAG INJ SC/IM: CPT | Performed by: FAMILY MEDICINE

## 2017-11-09 RX ORDER — AMLODIPINE BESYLATE 2.5 MG/1
2.5 TABLET ORAL DAILY
Qty: 30 TABLET | Refills: 3 | Status: SHIPPED | OUTPATIENT
Start: 2017-11-09 | End: 2018-04-03 | Stop reason: SDUPTHER

## 2017-11-09 RX ORDER — METHYLPREDNISOLONE SODIUM SUCCINATE 125 MG/2ML
125 INJECTION, POWDER, LYOPHILIZED, FOR SOLUTION INTRAMUSCULAR; INTRAVENOUS EVERY 6 HOURS
Status: DISCONTINUED | OUTPATIENT
Start: 2017-11-09 | End: 2022-11-09

## 2017-11-09 RX ADMIN — METHYLPREDNISOLONE SODIUM SUCCINATE 125 MG: 125 INJECTION, POWDER, LYOPHILIZED, FOR SOLUTION INTRAMUSCULAR; INTRAVENOUS at 14:20
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
## 2017-11-13 DIAGNOSIS — S62.025A CLOSED NONDISPLACED FRACTURE OF MIDDLE THIRD OF SCAPHOID BONE OF LEFT WRIST, INITIAL ENCOUNTER: Primary | ICD-10-CM

## 2017-11-14 ENCOUNTER — OFFICE VISIT (OUTPATIENT)
Dept: ORTHOPEDIC SURGERY | Facility: CLINIC | Age: 52
End: 2017-11-14

## 2017-11-14 DIAGNOSIS — S62.025D CLOSED NONDISPLACED FRACTURE OF MIDDLE THIRD OF SCAPHOID OF LEFT WRIST WITH ROUTINE HEALING, SUBSEQUENT ENCOUNTER: Primary | ICD-10-CM

## 2017-11-14 PROCEDURE — 99024 POSTOP FOLLOW-UP VISIT: CPT | Performed by: NURSE PRACTITIONER
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
## 2017-11-27 ENCOUNTER — DOCUMENTATION (OUTPATIENT)
Dept: CARDIAC SURGERY | Facility: CLINIC | Age: 52
End: 2017-11-27

## 2017-11-28 ENCOUNTER — ANTICOAGULATION VISIT (OUTPATIENT)
Dept: CARDIAC SURGERY | Facility: CLINIC | Age: 52
End: 2017-11-28

## 2017-11-28 VITALS — DIASTOLIC BLOOD PRESSURE: 82 MMHG | HEART RATE: 87 BPM | SYSTOLIC BLOOD PRESSURE: 131 MMHG

## 2017-11-28 DIAGNOSIS — Z79.01 LONG TERM CURRENT USE OF ANTICOAGULANT THERAPY: ICD-10-CM

## 2017-11-28 DIAGNOSIS — I26.99 PULMONARY EMBOLISM, BILATERAL (HCC): ICD-10-CM

## 2017-11-28 DIAGNOSIS — Z71.89 ENCOUNTER FOR ANTICOAGULATION DISCUSSION AND COUNSELING: ICD-10-CM

## 2017-11-28 DIAGNOSIS — I82.4Z1 ACUTE DEEP VEIN THROMBOSIS (DVT) OF DISTAL VEIN OF RIGHT LOWER EXTREMITY (HCC): ICD-10-CM

## 2017-11-28 DIAGNOSIS — I26.99 PE (PULMONARY THROMBOEMBOLISM) (HCC): ICD-10-CM

## 2017-11-28 LAB — INR PPP: 1.5 (ref 0.9–1.1)

## 2017-11-28 PROCEDURE — 85610 PROTHROMBIN TIME: CPT | Performed by: NURSE PRACTITIONER

## 2017-11-28 PROCEDURE — 99211 OFF/OP EST MAY X REQ PHY/QHP: CPT | Performed by: NURSE PRACTITIONER

## 2017-11-28 RX ORDER — WARFARIN SODIUM 7.5 MG/1
7.5 TABLET ORAL
Qty: 35 TABLET | Refills: 0 | Status: SHIPPED | OUTPATIENT
Start: 2017-11-28 | End: 2018-01-02 | Stop reason: SDUPTHER

## 2017-11-28 NOTE — PROGRESS NOTES
Pt denies med changes or bleeding problems. Pt states he missed last nights dose due to missing yesterdays appt and running out of Coumadin. Dose adjusted today and pt instructed to hold green veggies for 3 days; pt verbalized. Patient instructed regarding medication; results given and questions answered. Nutritional counseling given.  Dietary factors affecting therapy addressed.  Patient instructed to monitor for excessive bruising or bleeding. Will recheck in 2 weeks and if therapeutic will place out 1 month.        This document has been electronically signed by SALVATORE Cage on November 28, 2017 3:51 PM

## 2017-12-04 DIAGNOSIS — S62.025D CLOSED NONDISPLACED FRACTURE OF MIDDLE THIRD OF SCAPHOID OF LEFT WRIST WITH ROUTINE HEALING, SUBSEQUENT ENCOUNTER: Primary | ICD-10-CM

## 2017-12-05 ENCOUNTER — OFFICE VISIT (OUTPATIENT)
Dept: ORTHOPEDIC SURGERY | Facility: CLINIC | Age: 52
End: 2017-12-05

## 2017-12-05 VITALS — BODY MASS INDEX: 35.8 KG/M2 | HEIGHT: 76 IN | WEIGHT: 294 LBS

## 2017-12-05 DIAGNOSIS — S62.025D CLOSED NONDISPLACED FRACTURE OF MIDDLE THIRD OF SCAPHOID OF LEFT WRIST WITH ROUTINE HEALING, SUBSEQUENT ENCOUNTER: ICD-10-CM

## 2017-12-05 DIAGNOSIS — M25.532 LEFT WRIST PAIN: Primary | ICD-10-CM

## 2017-12-05 PROCEDURE — 29085 APPL CAST HAND&LWR FOREARM: CPT | Performed by: NURSE PRACTITIONER

## 2017-12-05 PROCEDURE — 99024 POSTOP FOLLOW-UP VISIT: CPT | Performed by: NURSE PRACTITIONER
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
## 2017-12-12 ENCOUNTER — DOCUMENTATION (OUTPATIENT)
Dept: CARDIAC SURGERY | Facility: CLINIC | Age: 52
End: 2017-12-12

## 2017-12-28 DIAGNOSIS — S62.025D CLOSED NONDISPLACED FRACTURE OF MIDDLE THIRD OF SCAPHOID OF LEFT WRIST WITH ROUTINE HEALING, SUBSEQUENT ENCOUNTER: Primary | ICD-10-CM

## 2018-01-02 ENCOUNTER — ANTICOAGULATION VISIT (OUTPATIENT)
Dept: CARDIAC SURGERY | Facility: CLINIC | Age: 53
End: 2018-01-02

## 2018-01-02 VITALS — HEART RATE: 112 BPM | DIASTOLIC BLOOD PRESSURE: 92 MMHG | SYSTOLIC BLOOD PRESSURE: 134 MMHG

## 2018-01-02 DIAGNOSIS — I26.99 PULMONARY EMBOLISM, BILATERAL (HCC): ICD-10-CM

## 2018-01-02 DIAGNOSIS — Z71.89 ENCOUNTER FOR ANTICOAGULATION DISCUSSION AND COUNSELING: ICD-10-CM

## 2018-01-02 DIAGNOSIS — I26.99 PE (PULMONARY THROMBOEMBOLISM) (HCC): ICD-10-CM

## 2018-01-02 DIAGNOSIS — Z79.01 LONG TERM CURRENT USE OF ANTICOAGULANT THERAPY: ICD-10-CM

## 2018-01-02 DIAGNOSIS — I82.4Z1 ACUTE DEEP VEIN THROMBOSIS (DVT) OF DISTAL VEIN OF RIGHT LOWER EXTREMITY (HCC): ICD-10-CM

## 2018-01-02 LAB — INR PPP: 2.2 (ref 0.9–1.1)

## 2018-01-02 PROCEDURE — 85610 PROTHROMBIN TIME: CPT | Performed by: NURSE PRACTITIONER

## 2018-01-02 RX ORDER — WARFARIN SODIUM 7.5 MG/1
7.5 TABLET ORAL
Qty: 35 TABLET | Refills: 0 | Status: SHIPPED | OUTPATIENT
Start: 2018-01-02 | End: 2018-01-29 | Stop reason: SDUPTHER

## 2018-01-02 NOTE — PROGRESS NOTES
Patient states no med changes or bleeding problems or unexplained bruising. Patient instructed to continue current dosing schedule. Verbalizes understanding. Will recheck 1 month.   Patient instructed regarding medication; results given and questions answered. Nutritional counseling given.  Dietary factors affecting therapy addressed.  Patient instructed to monitor for excessive bruising or bleeding.  Electronically signed by SALVATORE Gonzalez

## 2018-01-29 DIAGNOSIS — I26.99 PE (PULMONARY THROMBOEMBOLISM) (HCC): ICD-10-CM

## 2018-01-29 DIAGNOSIS — I26.99 PULMONARY EMBOLISM, BILATERAL (HCC): ICD-10-CM

## 2018-01-29 DIAGNOSIS — Z71.89 ENCOUNTER FOR ANTICOAGULATION DISCUSSION AND COUNSELING: ICD-10-CM

## 2018-01-29 DIAGNOSIS — I82.4Z1 ACUTE DEEP VEIN THROMBOSIS (DVT) OF DISTAL VEIN OF RIGHT LOWER EXTREMITY (HCC): ICD-10-CM

## 2018-01-29 RX ORDER — WARFARIN SODIUM 7.5 MG/1
7.5 TABLET ORAL
Qty: 35 TABLET | Refills: 0 | Status: SHIPPED | OUTPATIENT
Start: 2018-01-29 | End: 2018-03-19 | Stop reason: SDUPTHER

## 2018-01-30 ENCOUNTER — DOCUMENTATION (OUTPATIENT)
Dept: CARDIAC SURGERY | Facility: CLINIC | Age: 53
End: 2018-01-30

## 2018-02-12 DIAGNOSIS — I26.99 PULMONARY EMBOLISM, BILATERAL (HCC): ICD-10-CM

## 2018-02-12 DIAGNOSIS — Z71.89 ENCOUNTER FOR ANTICOAGULATION DISCUSSION AND COUNSELING: ICD-10-CM

## 2018-02-12 DIAGNOSIS — I26.99 PE (PULMONARY THROMBOEMBOLISM) (HCC): ICD-10-CM

## 2018-02-12 DIAGNOSIS — I82.4Z1 ACUTE DEEP VEIN THROMBOSIS (DVT) OF DISTAL VEIN OF RIGHT LOWER EXTREMITY (HCC): ICD-10-CM

## 2018-02-12 RX ORDER — WARFARIN SODIUM 7.5 MG/1
TABLET ORAL
Qty: 35 TABLET | Refills: 0 | OUTPATIENT
Start: 2018-02-12

## 2018-03-14 ENCOUNTER — DOCUMENTATION (OUTPATIENT)
Dept: CARDIAC SURGERY | Facility: CLINIC | Age: 53
End: 2018-03-14

## 2018-03-19 ENCOUNTER — ANTICOAGULATION VISIT (OUTPATIENT)
Dept: CARDIAC SURGERY | Facility: CLINIC | Age: 53
End: 2018-03-19

## 2018-03-19 VITALS — SYSTOLIC BLOOD PRESSURE: 148 MMHG | DIASTOLIC BLOOD PRESSURE: 84 MMHG

## 2018-03-19 DIAGNOSIS — Z71.89 ENCOUNTER FOR ANTICOAGULATION DISCUSSION AND COUNSELING: ICD-10-CM

## 2018-03-19 DIAGNOSIS — I26.99 PE (PULMONARY THROMBOEMBOLISM) (HCC): ICD-10-CM

## 2018-03-19 DIAGNOSIS — I82.4Z1 ACUTE DEEP VEIN THROMBOSIS (DVT) OF DISTAL VEIN OF RIGHT LOWER EXTREMITY (HCC): ICD-10-CM

## 2018-03-19 DIAGNOSIS — I26.99 PULMONARY EMBOLISM, BILATERAL (HCC): ICD-10-CM

## 2018-03-19 DIAGNOSIS — Z79.01 LONG TERM CURRENT USE OF ANTICOAGULANT THERAPY: ICD-10-CM

## 2018-03-19 LAB — INR PPP: 1.6 (ref 0.9–1.1)

## 2018-03-19 PROCEDURE — 85610 PROTHROMBIN TIME: CPT | Performed by: NURSE PRACTITIONER

## 2018-03-19 PROCEDURE — 99211 OFF/OP EST MAY X REQ PHY/QHP: CPT | Performed by: NURSE PRACTITIONER

## 2018-03-19 RX ORDER — WARFARIN SODIUM 7.5 MG/1
7.5 TABLET ORAL
Qty: 35 TABLET | Refills: 0 | Status: SHIPPED | OUTPATIENT
Start: 2018-03-19 | End: 2018-04-27 | Stop reason: SDUPTHER

## 2018-03-19 NOTE — PROGRESS NOTES
PT ran out of Coumadin and recently missed a dose. PT denies any new medications or bleeding issues. PT denies any s/s of blood clot. Adjusted pt's dose today and instructed to hold green vegs for 3 days. PT will be seen in 2 weeks. If ok, pt will be placed out 1 month. Patient instructed regarding medication; results given and questions answered. Nutritional counseling given.  Dietary factors affecting therapy addressed.  Patient instructed to monitor for excessive bruising or bleeding. PT verbalizes understanding.         This document has been electronically signed by SALVATORE Cage on March 21, 2018 5:11 PM

## 2018-04-03 ENCOUNTER — OFFICE VISIT (OUTPATIENT)
Dept: CARDIOLOGY | Facility: CLINIC | Age: 53
End: 2018-04-03

## 2018-04-03 ENCOUNTER — ANTICOAGULATION VISIT (OUTPATIENT)
Dept: CARDIAC SURGERY | Facility: CLINIC | Age: 53
End: 2018-04-03

## 2018-04-03 VITALS
BODY MASS INDEX: 37.26 KG/M2 | WEIGHT: 306 LBS | HEART RATE: 78 BPM | DIASTOLIC BLOOD PRESSURE: 92 MMHG | HEIGHT: 76 IN | SYSTOLIC BLOOD PRESSURE: 146 MMHG

## 2018-04-03 DIAGNOSIS — I26.99 PULMONARY EMBOLISM, BILATERAL (HCC): ICD-10-CM

## 2018-04-03 DIAGNOSIS — Z79.01 LONG TERM CURRENT USE OF ANTICOAGULANT THERAPY: ICD-10-CM

## 2018-04-03 DIAGNOSIS — I10 ESSENTIAL HYPERTENSION: Primary | ICD-10-CM

## 2018-04-03 DIAGNOSIS — I82.4Z1 ACUTE DEEP VEIN THROMBOSIS (DVT) OF DISTAL VEIN OF RIGHT LOWER EXTREMITY (HCC): ICD-10-CM

## 2018-04-03 DIAGNOSIS — R06.00 DYSPNEA, UNSPECIFIED TYPE: ICD-10-CM

## 2018-04-03 DIAGNOSIS — Z86.718 HISTORY OF DVT (DEEP VEIN THROMBOSIS): Primary | ICD-10-CM

## 2018-04-03 LAB — INR PPP: 2.2 (ref 0.9–1.1)

## 2018-04-03 PROCEDURE — 85610 PROTHROMBIN TIME: CPT | Performed by: NURSE PRACTITIONER

## 2018-04-03 PROCEDURE — 99213 OFFICE O/P EST LOW 20 MIN: CPT | Performed by: INTERNAL MEDICINE

## 2018-04-03 RX ORDER — AMLODIPINE BESYLATE 2.5 MG/1
2.5 TABLET ORAL DAILY
Qty: 30 TABLET | Refills: 6 | Status: SHIPPED | OUTPATIENT
Start: 2018-04-03 | End: 2018-10-24 | Stop reason: SDUPTHER

## 2018-04-03 NOTE — PROGRESS NOTES
Pt checked whiles seeing Dr. Little.  Pt denies med changes or bleeding issus.  Recheck INR in one month.  Pt verbalizes.  Patient instructed regarding medication; results given and questions answered. Nutritional counseling given.  Dietary factors affecting therapy addressed.  Patient instructed to monitor for excessive bruising or bleeding.  Electronically signed by SALVATORE Gonzalez

## 2018-04-03 NOTE — PROGRESS NOTES
Edgardo Ugarte  52 y.o. male    04/03/2018  1. Essential hypertension    2. Dyspnea, unspecified type    3. Pulmonary embolism, bilateral    4. Acute deep vein thrombosis (DVT) of distal vein of right lower extremity        History of Present Illness      Her stated years old patient who admitted to [facility] eighth of March 2015 for evaluation shortness breath and dizzy.  Patient noted to have markedly abnormal d-dimer.  Patient injured  his right leg week prior to that.  CT coronary angiogram confirmed the bilateral pulmonary embolism.  Echocardiographic study was done.  normal left and a systolic function with a mildly dilated aortic root diameter 4.0 and mildly dilated left atrium.  The patient on chronic or oral anticoagulations since that.  After discussion with the patient decided to go to discontinue warfarin.  He will continue amlodipine.  Patient denies orthopnea, PND, nausea, vomiting, diarrhea.  Denies any palpitation or fluttering.  Denies any dyspnea on exertion    ECHO 7/2017  · Mild tricuspid valve regurgitation is present.  · Left ventricular systolic function is normal. Estimated EF = 60%.  · Left atrial cavity size is borderline dilated.               Greater Vessels Measurements     Ao root diam 3.6 cm      ACS 2.6 cm             SUBJECTIVE    Allergies   Allergen Reactions   • Penicillins          Past Medical History:   Diagnosis Date   • H/O echocardiogram 03/31/2015    Technically a difficult study. Overall LV contractility normal with Ef of 60-65%.Mild LVH and diastolic relaxation abnormality of left ventricle.Mild aortic root dilatation and mild left atrial dilatation.RV mildly dilated   • Hypertension    • Long term (current) use of anticoagulants    • Personal history of PE (pulmonary embolism)    • Pulmonary embolism without acute cor pulmonale    • Pulmonary embolus    • Secondary hypercoagulability disorder          Past Surgical History:   Procedure Laterality Date   •  "CHOLECYSTECTOMY           Family History   Problem Relation Age of Onset   • Hypertension Other    • Diabetes Father    • Heart failure Father    • Lung cancer Father    • Bleeding Disorder Neg Hx    • Stroke Neg Hx    • Pulmonary embolism Neg Hx          Social History     Social History   • Marital status:      Spouse name: N/A   • Number of children: N/A   • Years of education: N/A     Occupational History   • Not on file.     Social History Main Topics   • Smoking status: Never Smoker   • Smokeless tobacco: Never Used   • Alcohol use No   • Drug use: No   • Sexual activity: Defer     Other Topics Concern   • Not on file     Social History Narrative    He is a Z[employer].          Current Outpatient Prescriptions   Medication Sig Dispense Refill   • warfarin (COUMADIN) 7.5 MG tablet Take 1 tablet by mouth Daily. 35 tablet 0   • amLODIPine (NORVASC) 2.5 MG tablet Take 1 tablet by mouth Daily. 30 tablet 6     Current Facility-Administered Medications   Medication Dose Route Frequency Provider Last Rate Last Dose   • methylPREDNISolone sodium succinate (SOLU-Medrol) injection 125 mg  125 mg Intravenous Q6H Awa Woodall MD   125 mg at 11/09/17 1420         OBJECTIVE    /92   Pulse 78   Ht 193 cm (76\")   Wt (!) 139 kg (306 lb)   BMI 37.25 kg/m²         Review of Systems     Constitutional:  Denies recent weight loss, weight gain, fever or chills, no change in exercise tolerance     HENT:  Denies any hearing loss, epistaxis, hoarseness, or difficulty speaking.     Eyes:No blurring     Respiratory:  History of pulmonary embolism    Cardiovascular: Negative for palpations, chest pain, orthopnea, PND, peripheral edema, syncope, or claudication.     Gastrointestinal:  Denies change in bowel habits, dyspepsia, ulcer disease, hematochezia, or melena.     Endocrine: Negative for cold intolerance, heat intolerance, polydipsia, polyphagia and polyuria. Denies any history of weight change, heat/cold " intolerance, polydipsia, polyuria     Genitourinary: Negative.      Musculoskeletal: Denies any history of arthritic symptoms or back problems     Skin:  Denies any change in hair or nails, rashes, or skin lesions.     Allergic/Immunologic: Negative.  Negative for environmental allergies, food allergies and immunocompromised state.     Neurological:  Denies any history of recurrent headaches, strokes, TIA, or seizure disorder.     Hematological: Denies any food allergies, seasonal allergies, bleeding disorders, or lymphadenopathy.     Psychiatric/Behavioral: Denies any history of depression, substance abuse, or change in cognitive function.         Physical Exam     Constitutional: Cooperative, alert and oriented, well-developed, well-nourished, in no acute distress.     HENT:   Head: Normocephalic, normal hair patterns, no masses or tenderness.  Ears, Nose, and Throat: No gross abnormalities. No pallor or cyanosis. Dentition good.   Eyes: EOMS intact, PERRL, conjunctivae and lids unremarkable. Fundoscopic exam and visual fields not performed.   Neck: No palpable masses or adenopathy, no thyromegaly, no JVD, carotid pulses are full and equal bilaterally and without  Bruits.     Cardiovascular: Regular rhythm, S1 and S2 normal, no S3 or S4. Apical impulse not displaced. No murmurs, gallops, or rubs detected.     Pulmonary/Chest: Chest: normal symmetry, no tenderness to palpation, normal respiratory excursion, no intercostal retraction, no use of accessory muscles.            Pulmonary: Normal breath sounds. No rales or ronchi.    Abdominal: Abdomen soft, bowel sounds normoactive, no masses, no hepatosplenomegaly, non-tender, no bruits.     Musculoskeletal: No deformities, clubbing, cyanosis, erythema, or edema observed. There are no spinal abnormalities noted. Normal muscle strength and tone. Pulses full and equal in all extremities, no bruits auscultated.     Neurological: No gross motor or sensory deficits noted,  affect appropriate, oriented to time, person, place.     Skin: Warm and dry to the touch, no apparent skin lesions or masses noted.     Psychiatric: He has a normal mood and affect. His behavior is normal. Judgment and thought content normal.         Procedures      Lab Results   Component Value Date    WBC 6.22 07/15/2017    HGB 15.0 07/15/2017    HCT 42.3 07/15/2017    MCV 88.1 07/15/2017     07/15/2017     Lab Results   Component Value Date    GLUCOSE 85 07/15/2017    BUN 10 07/15/2017    CREATININE 0.82 07/15/2017    EGFRIFNONA 98 07/15/2017    BCR 12.2 07/15/2017    CO2 24.0 07/15/2017    CALCIUM 8.7 07/15/2017    ALBUMIN 4.40 07/10/2017    LABIL2 1.2 07/10/2017    AST 32 07/10/2017    ALT 47 07/10/2017     No results found for: CHOL  No results found for: TRIG  No results found for: HDL  No components found for: LDLCALC  No results found for: LDL  No results found for: HDLLDLRATIO  No components found for: CHOLHDL  No results found for: HGBA1C  Lab Results   Component Value Date    TSH 1.06 05/31/2016           ASSESSMENT AND PLAN  History of pulmonary embolism March 2015 #2 hypertension    Clinically there is no sign of any acute cardiac decompensation based on the clinical history physical finding.  No evidence of active ischemia.  He had a pulmonary embolism in 2015 after injury to his right leg.  After discussion with the patient decided to discontinue warfarin.  He will continue amlodipine for hypertension with good blood pressure control.  Will arrange an echocardiographic study given the mildly dilated aortic root with the last echo in March 2015. Date echocardiogram study in 2017 normal left and a systolic function and aortic root within normal size.  The patient need evidence that there is no DVT as a part of his ROBERTA.  Will arrange venous Doppler of lower extremity.   We'll see him back in this 8 month to one year R depends on patient clinical condition.  Risk factor lifestyle modification  discussed and dashdiet discussed.    Edgardo was seen today for follow-up.    Diagnoses and all orders for this visit:    Essential hypertension    Dyspnea, unspecified type    Pulmonary embolism, bilateral    Acute deep vein thrombosis (DVT) of distal vein of right lower extremity        Whit Litlte MD  4/3/2018  4:12 PM

## 2018-04-27 DIAGNOSIS — Z71.89 ENCOUNTER FOR ANTICOAGULATION DISCUSSION AND COUNSELING: ICD-10-CM

## 2018-04-27 DIAGNOSIS — I82.4Z1 ACUTE DEEP VEIN THROMBOSIS (DVT) OF DISTAL VEIN OF RIGHT LOWER EXTREMITY (HCC): ICD-10-CM

## 2018-04-27 DIAGNOSIS — I26.99 PE (PULMONARY THROMBOEMBOLISM) (HCC): ICD-10-CM

## 2018-04-27 DIAGNOSIS — I26.99 PULMONARY EMBOLISM, BILATERAL (HCC): ICD-10-CM

## 2018-04-27 RX ORDER — WARFARIN SODIUM 7.5 MG/1
7.5 TABLET ORAL
Qty: 35 TABLET | Refills: 0 | Status: SHIPPED | OUTPATIENT
Start: 2018-04-27 | End: 2018-07-17 | Stop reason: SDUPTHER

## 2018-05-02 ENCOUNTER — DOCUMENTATION (OUTPATIENT)
Dept: CARDIAC SURGERY | Facility: CLINIC | Age: 53
End: 2018-05-02

## 2018-06-01 ENCOUNTER — DOCUMENTATION (OUTPATIENT)
Dept: CARDIAC SURGERY | Facility: CLINIC | Age: 53
End: 2018-06-01

## 2018-06-04 ENCOUNTER — ANTICOAGULATION VISIT (OUTPATIENT)
Dept: CARDIAC SURGERY | Facility: CLINIC | Age: 53
End: 2018-06-04

## 2018-06-04 VITALS — HEART RATE: 84 BPM | DIASTOLIC BLOOD PRESSURE: 92 MMHG | SYSTOLIC BLOOD PRESSURE: 132 MMHG

## 2018-06-04 DIAGNOSIS — Z79.01 LONG TERM CURRENT USE OF ANTICOAGULANT THERAPY: ICD-10-CM

## 2018-06-04 DIAGNOSIS — I26.99 PULMONARY EMBOLISM, BILATERAL (HCC): ICD-10-CM

## 2018-06-04 LAB — INR PPP: 1.6 (ref 0.9–1.1)

## 2018-06-04 PROCEDURE — 99211 OFF/OP EST MAY X REQ PHY/QHP: CPT | Performed by: NURSE PRACTITIONER

## 2018-06-04 PROCEDURE — 85610 PROTHROMBIN TIME: CPT | Performed by: NURSE PRACTITIONER

## 2018-06-04 NOTE — PROGRESS NOTES
Pt overdue for INR today.  Pt denies medication changes but states he did miss two doses of coumadin due to running out; pt states it is difficult to get to his appts due to work schedule.  Adjusted coumadin dose and instructed pt to limit green intake for three days.  Due to pt's work schedule, made pt a monthly appt.  Coumadin 7.5mg refill called to [facility], [facility] no additional refills..  Pt verbalizes. Patient instructed regarding medication; results given and questions answered. Nutritional counseling given.  Dietary factors affecting therapy addressed.  Patient instructed to monitor for excessive bruising or bleeding.  Electronically signed by SALVATORE Gonzalez

## 2018-07-05 ENCOUNTER — DOCUMENTATION (OUTPATIENT)
Dept: CARDIAC SURGERY | Facility: CLINIC | Age: 53
End: 2018-07-05

## 2018-07-08 DIAGNOSIS — Z71.89 ENCOUNTER FOR ANTICOAGULATION DISCUSSION AND COUNSELING: ICD-10-CM

## 2018-07-08 DIAGNOSIS — I82.4Z1 ACUTE DEEP VEIN THROMBOSIS (DVT) OF DISTAL VEIN OF RIGHT LOWER EXTREMITY (HCC): ICD-10-CM

## 2018-07-08 DIAGNOSIS — I26.99 PE (PULMONARY THROMBOEMBOLISM) (HCC): ICD-10-CM

## 2018-07-08 DIAGNOSIS — I26.99 PULMONARY EMBOLISM, BILATERAL (HCC): ICD-10-CM

## 2018-07-09 RX ORDER — WARFARIN SODIUM 7.5 MG/1
TABLET ORAL
Qty: 35 TABLET | Refills: 0 | OUTPATIENT
Start: 2018-07-09

## 2018-07-14 DIAGNOSIS — I82.4Z1 ACUTE DEEP VEIN THROMBOSIS (DVT) OF DISTAL VEIN OF RIGHT LOWER EXTREMITY (HCC): ICD-10-CM

## 2018-07-14 DIAGNOSIS — I26.99 PULMONARY EMBOLISM, BILATERAL (HCC): ICD-10-CM

## 2018-07-14 DIAGNOSIS — I26.99 PE (PULMONARY THROMBOEMBOLISM) (HCC): ICD-10-CM

## 2018-07-14 DIAGNOSIS — Z71.89 ENCOUNTER FOR ANTICOAGULATION DISCUSSION AND COUNSELING: ICD-10-CM

## 2018-07-16 RX ORDER — WARFARIN SODIUM 7.5 MG/1
TABLET ORAL
Qty: 35 TABLET | Refills: 0 | OUTPATIENT
Start: 2018-07-16

## 2018-07-17 DIAGNOSIS — I82.4Z1 ACUTE DEEP VEIN THROMBOSIS (DVT) OF DISTAL VEIN OF RIGHT LOWER EXTREMITY (HCC): ICD-10-CM

## 2018-07-17 DIAGNOSIS — Z71.89 ENCOUNTER FOR ANTICOAGULATION DISCUSSION AND COUNSELING: ICD-10-CM

## 2018-07-17 DIAGNOSIS — I26.99 PE (PULMONARY THROMBOEMBOLISM) (HCC): ICD-10-CM

## 2018-07-17 DIAGNOSIS — I26.99 PULMONARY EMBOLISM, BILATERAL (HCC): ICD-10-CM

## 2018-07-17 RX ORDER — WARFARIN SODIUM 7.5 MG/1
7.5 TABLET ORAL
Qty: 7 TABLET | Refills: 0 | Status: SHIPPED | OUTPATIENT
Start: 2018-07-17 | End: 2018-07-19 | Stop reason: SDUPTHER

## 2018-07-19 ENCOUNTER — ANTICOAGULATION VISIT (OUTPATIENT)
Dept: CARDIAC SURGERY | Facility: CLINIC | Age: 53
End: 2018-07-19

## 2018-07-19 VITALS — HEART RATE: 80 BPM | SYSTOLIC BLOOD PRESSURE: 135 MMHG | DIASTOLIC BLOOD PRESSURE: 85 MMHG

## 2018-07-19 DIAGNOSIS — I82.4Z1 ACUTE DEEP VEIN THROMBOSIS (DVT) OF DISTAL VEIN OF RIGHT LOWER EXTREMITY (HCC): ICD-10-CM

## 2018-07-19 DIAGNOSIS — Z79.01 LONG TERM CURRENT USE OF ANTICOAGULANT THERAPY: ICD-10-CM

## 2018-07-19 DIAGNOSIS — I26.99 PE (PULMONARY THROMBOEMBOLISM) (HCC): ICD-10-CM

## 2018-07-19 DIAGNOSIS — I26.99 PULMONARY EMBOLISM, BILATERAL (HCC): ICD-10-CM

## 2018-07-19 DIAGNOSIS — Z71.89 ENCOUNTER FOR ANTICOAGULATION DISCUSSION AND COUNSELING: ICD-10-CM

## 2018-07-19 LAB — INR PPP: 3.1 (ref 0.9–1.1)

## 2018-07-19 PROCEDURE — 85610 PROTHROMBIN TIME: CPT | Performed by: NURSE PRACTITIONER

## 2018-07-19 RX ORDER — WARFARIN SODIUM 7.5 MG/1
7.5 TABLET ORAL
Qty: 30 TABLET | Refills: 0 | Status: SHIPPED | OUTPATIENT
Start: 2018-07-19 | End: 2018-08-18 | Stop reason: SDUPTHER

## 2018-07-19 NOTE — PROGRESS NOTES
Patient states no med changes or bleeding problems or unexplained bruising. Patient instructed to continue current dosing schedule. Verbalizes understanding. Will recheck 1 month.  Pt instructed to have a serving of green veggies or a salad today; pt verbalized. Patient instructed regarding medication; results given and questions answered. Nutritional counseling given.  Dietary factors affecting therapy addressed.  Patient instructed to monitor for excessive bruising or bleeding.         This document has been electronically signed by SALVATORE Silva on July 19, 2018 3:25 PM

## 2018-08-18 DIAGNOSIS — Z71.89 ENCOUNTER FOR ANTICOAGULATION DISCUSSION AND COUNSELING: ICD-10-CM

## 2018-08-18 DIAGNOSIS — I26.99 PULMONARY EMBOLISM, BILATERAL (HCC): ICD-10-CM

## 2018-08-18 DIAGNOSIS — I82.4Z1 ACUTE DEEP VEIN THROMBOSIS (DVT) OF DISTAL VEIN OF RIGHT LOWER EXTREMITY (HCC): ICD-10-CM

## 2018-08-18 DIAGNOSIS — I26.99 PE (PULMONARY THROMBOEMBOLISM) (HCC): ICD-10-CM

## 2018-08-20 RX ORDER — WARFARIN SODIUM 7.5 MG/1
7.5 TABLET ORAL
Qty: 30 TABLET | Refills: 0 | Status: SHIPPED | OUTPATIENT
Start: 2018-08-20 | End: 2018-09-24 | Stop reason: SDUPTHER

## 2018-08-23 ENCOUNTER — ANTICOAGULATION VISIT (OUTPATIENT)
Dept: CARDIAC SURGERY | Facility: CLINIC | Age: 53
End: 2018-08-23

## 2018-08-23 VITALS — SYSTOLIC BLOOD PRESSURE: 146 MMHG | DIASTOLIC BLOOD PRESSURE: 92 MMHG | HEART RATE: 80 BPM

## 2018-08-23 DIAGNOSIS — Z79.01 LONG TERM CURRENT USE OF ANTICOAGULANT THERAPY: ICD-10-CM

## 2018-08-23 DIAGNOSIS — I26.99 PULMONARY EMBOLISM, BILATERAL (HCC): ICD-10-CM

## 2018-08-23 LAB — INR PPP: 3.2 (ref 0.9–1.1)

## 2018-08-23 PROCEDURE — 85610 PROTHROMBIN TIME: CPT | Performed by: NURSE PRACTITIONER

## 2018-08-23 NOTE — PROGRESS NOTES
PT has only been eating light green salads. PT states he will incorporate some slightly darker greens. Denies any med changes or bleeding issues. Adjusted pt's dose for today only and instructed to increase vit k today. PT will be seen in 1 month due to work schedule. Patient instructed regarding medication; results given and questions answered. Nutritional counseling given.  Dietary factors affecting therapy addressed.  Patient instructed to monitor for excessive bruising or bleeding. PT verbalizes understanding.         This document has been electronically signed by SALVATORE Silva on August 23, 2018 3:40 PM

## 2018-09-24 ENCOUNTER — ANTICOAGULATION VISIT (OUTPATIENT)
Dept: CARDIAC SURGERY | Facility: CLINIC | Age: 53
End: 2018-09-24

## 2018-09-24 VITALS — SYSTOLIC BLOOD PRESSURE: 154 MMHG | HEART RATE: 68 BPM | DIASTOLIC BLOOD PRESSURE: 94 MMHG

## 2018-09-24 DIAGNOSIS — I26.99 PE (PULMONARY THROMBOEMBOLISM) (HCC): ICD-10-CM

## 2018-09-24 DIAGNOSIS — Z71.89 ENCOUNTER FOR ANTICOAGULATION DISCUSSION AND COUNSELING: ICD-10-CM

## 2018-09-24 DIAGNOSIS — I26.99 PULMONARY EMBOLISM, BILATERAL (HCC): ICD-10-CM

## 2018-09-24 DIAGNOSIS — I82.4Z1 ACUTE DEEP VEIN THROMBOSIS (DVT) OF DISTAL VEIN OF RIGHT LOWER EXTREMITY (HCC): ICD-10-CM

## 2018-09-24 DIAGNOSIS — Z79.01 LONG TERM CURRENT USE OF ANTICOAGULANT THERAPY: ICD-10-CM

## 2018-09-24 LAB — INR PPP: 3.1 (ref 0.9–1.1)

## 2018-09-24 PROCEDURE — 85610 PROTHROMBIN TIME: CPT | Performed by: NURSE PRACTITIONER

## 2018-09-24 RX ORDER — WARFARIN SODIUM 7.5 MG/1
7.5 TABLET ORAL
Qty: 30 TABLET | Refills: 0 | Status: SHIPPED | OUTPATIENT
Start: 2018-09-24 | End: 2018-10-24 | Stop reason: SDUPTHER

## 2018-09-24 NOTE — PROGRESS NOTES
Patient states no med changes or bleeding problems or unexplained bruising. Patient instructed to continue current dosing schedule. Verbalizes understanding. Will recheck 1 month.  Instructed pt to increase Vit K intake today with a broccoli serving.  Patient instructed regarding medication; results given and questions answered. Nutritional counseling given.  Dietary factors affecting therapy addressed.  Patient instructed to monitor for excessive bruising or bleeding.        This document has been electronically signed by Princess Mckeon, MARYANN @ on September 24, 2018 3:39 PM

## 2018-10-24 ENCOUNTER — ANTICOAGULATION VISIT (OUTPATIENT)
Dept: CARDIAC SURGERY | Facility: CLINIC | Age: 53
End: 2018-10-24

## 2018-10-24 VITALS — SYSTOLIC BLOOD PRESSURE: 151 MMHG | HEART RATE: 97 BPM | OXYGEN SATURATION: 97 % | DIASTOLIC BLOOD PRESSURE: 89 MMHG

## 2018-10-24 DIAGNOSIS — I82.4Z1 ACUTE DEEP VEIN THROMBOSIS (DVT) OF DISTAL VEIN OF RIGHT LOWER EXTREMITY (HCC): ICD-10-CM

## 2018-10-24 DIAGNOSIS — I26.99 PULMONARY EMBOLISM, BILATERAL (HCC): ICD-10-CM

## 2018-10-24 DIAGNOSIS — Z71.89 ENCOUNTER FOR ANTICOAGULATION DISCUSSION AND COUNSELING: ICD-10-CM

## 2018-10-24 DIAGNOSIS — I26.99 PE (PULMONARY THROMBOEMBOLISM) (HCC): ICD-10-CM

## 2018-10-24 DIAGNOSIS — Z79.01 LONG TERM CURRENT USE OF ANTICOAGULANT THERAPY: ICD-10-CM

## 2018-10-24 LAB — INR PPP: 2.8 (ref 0.9–1.1)

## 2018-10-24 PROCEDURE — 85610 PROTHROMBIN TIME: CPT | Performed by: NURSE PRACTITIONER

## 2018-10-24 RX ORDER — WARFARIN SODIUM 7.5 MG/1
7.5 TABLET ORAL
Qty: 30 TABLET | Refills: 1 | Status: SHIPPED | OUTPATIENT
Start: 2018-10-24 | End: 2018-12-27 | Stop reason: SDUPTHER

## 2018-10-24 RX ORDER — AMLODIPINE BESYLATE 2.5 MG/1
2.5 TABLET ORAL DAILY
Qty: 90 TABLET | Refills: 2 | Status: SHIPPED | OUTPATIENT
Start: 2018-10-24

## 2018-10-24 NOTE — PROGRESS NOTES
Patient states no med changes or bleeding problems or unexplained bruising. Patient instructed to continue current dosing schedule. Verbalizes understanding. Will recheck 1 month.  Patient instructed regarding medication; results given and questions answered. Nutritional counseling given.  Dietary factors affecting therapy addressed.  Patient instructed to monitor for excessive bruising or bleeding.         This document has been electronically signed by MARYANN Rivero @ on October 24, 2018 3:42 PM

## 2018-10-25 RX ORDER — WARFARIN SODIUM 7.5 MG/1
TABLET ORAL
Qty: 90 TABLET | Refills: 1 | OUTPATIENT
Start: 2018-10-25

## 2018-11-06 ENCOUNTER — APPOINTMENT (OUTPATIENT)
Dept: GENERAL RADIOLOGY | Facility: HOSPITAL | Age: 53
End: 2018-11-06

## 2018-11-06 ENCOUNTER — HOSPITAL ENCOUNTER (EMERGENCY)
Facility: HOSPITAL | Age: 53
Discharge: HOME | End: 2018-11-06
Attending: EMERGENCY MEDICINE | Admitting: EMERGENCY MEDICINE

## 2018-11-06 ENCOUNTER — APPOINTMENT (OUTPATIENT)
Dept: ULTRASOUND IMAGING | Facility: HOSPITAL | Age: 53
End: 2018-11-06

## 2018-11-06 VITALS
SYSTOLIC BLOOD PRESSURE: 149 MMHG | RESPIRATION RATE: 20 BRPM | HEIGHT: 75 IN | WEIGHT: 312 LBS | OXYGEN SATURATION: 98 % | TEMPERATURE: 98.2 F | BODY MASS INDEX: 38.79 KG/M2 | DIASTOLIC BLOOD PRESSURE: 97 MMHG | HEART RATE: 81 BPM

## 2018-11-06 DIAGNOSIS — R07.89 CHEST DISCOMFORT: ICD-10-CM

## 2018-11-06 DIAGNOSIS — M79.651 PAIN OF RIGHT THIGH: Primary | ICD-10-CM

## 2018-11-06 LAB
ALBUMIN SERPL-MCNC: 4.4 G/DL (ref 3.4–4.8)
ALBUMIN/GLOB SERPL: 1.3 G/DL (ref 1.1–1.8)
ALP SERPL-CCNC: 69 U/L (ref 38–126)
ALT SERPL W P-5'-P-CCNC: 56 U/L (ref 21–72)
ANION GAP SERPL CALCULATED.3IONS-SCNC: 10 MMOL/L (ref 5–15)
AST SERPL-CCNC: 50 U/L (ref 17–59)
BASOPHILS # BLD AUTO: 0.01 10*3/MM3 (ref 0–0.2)
BASOPHILS NFR BLD AUTO: 0.1 % (ref 0–2)
BILIRUB SERPL-MCNC: 1 MG/DL (ref 0.2–1.3)
BUN BLD-MCNC: 19 MG/DL (ref 7–21)
BUN/CREAT SERPL: 21.3 (ref 7–25)
CALCIUM SPEC-SCNC: 9 MG/DL (ref 8.4–10.2)
CHLORIDE SERPL-SCNC: 102 MMOL/L (ref 95–110)
CO2 SERPL-SCNC: 23 MMOL/L (ref 22–31)
CREAT BLD-MCNC: 0.89 MG/DL (ref 0.7–1.3)
D-DIMER, QUANTITATIVE (MAD,POW, STR): <270 NG/ML (FEU) (ref 0–470)
DEPRECATED RDW RBC AUTO: 38.9 FL (ref 35.1–43.9)
EOSINOPHIL # BLD AUTO: 0.11 10*3/MM3 (ref 0–0.7)
EOSINOPHIL NFR BLD AUTO: 1.4 % (ref 0–7)
ERYTHROCYTE [DISTWIDTH] IN BLOOD BY AUTOMATED COUNT: 12.2 % (ref 11.5–14.5)
GFR SERPL CREATININE-BSD FRML MDRD: 89 ML/MIN/1.73 (ref 56–130)
GLOBULIN UR ELPH-MCNC: 3.5 GM/DL (ref 2.3–3.5)
GLUCOSE BLD-MCNC: 99 MG/DL (ref 60–100)
HCT VFR BLD AUTO: 42.4 % (ref 39–49)
HGB BLD-MCNC: 15.1 G/DL (ref 13.7–17.3)
HOLD SPECIMEN: NORMAL
HOLD SPECIMEN: NORMAL
IMM GRANULOCYTES # BLD: 0.02 10*3/MM3 (ref 0–0.02)
IMM GRANULOCYTES NFR BLD: 0.3 % (ref 0–0.5)
INR PPP: 2.11 (ref 0.8–1.2)
LYMPHOCYTES # BLD AUTO: 2.74 10*3/MM3 (ref 0.6–4.2)
LYMPHOCYTES NFR BLD AUTO: 35.6 % (ref 10–50)
MCH RBC QN AUTO: 31.7 PG (ref 26.5–34)
MCHC RBC AUTO-ENTMCNC: 35.6 G/DL (ref 31.5–36.3)
MCV RBC AUTO: 89.1 FL (ref 80–98)
MONOCYTES # BLD AUTO: 0.78 10*3/MM3 (ref 0–0.9)
MONOCYTES NFR BLD AUTO: 10.1 % (ref 0–12)
NEUTROPHILS # BLD AUTO: 4.03 10*3/MM3 (ref 2–8.6)
NEUTROPHILS NFR BLD AUTO: 52.5 % (ref 37–80)
PLATELET # BLD AUTO: 234 10*3/MM3 (ref 150–450)
PMV BLD AUTO: 11 FL (ref 8–12)
POTASSIUM BLD-SCNC: 3.9 MMOL/L (ref 3.5–5.1)
PROT SERPL-MCNC: 7.9 G/DL (ref 6.3–8.6)
PROTHROMBIN TIME: 22.8 SECONDS (ref 11.1–15.3)
RBC # BLD AUTO: 4.76 10*6/MM3 (ref 4.37–5.74)
SODIUM BLD-SCNC: 135 MMOL/L (ref 137–145)
TROPONIN I SERPL-MCNC: <0.012 NG/ML
WBC NRBC COR # BLD: 7.69 10*3/MM3 (ref 3.2–9.8)
WHOLE BLOOD HOLD SPECIMEN: NORMAL

## 2018-11-06 PROCEDURE — 93970 EXTREMITY STUDY: CPT

## 2018-11-06 PROCEDURE — 85379 FIBRIN DEGRADATION QUANT: CPT | Performed by: EMERGENCY MEDICINE

## 2018-11-06 PROCEDURE — 93010 ELECTROCARDIOGRAM REPORT: CPT | Performed by: INTERNAL MEDICINE

## 2018-11-06 PROCEDURE — 85610 PROTHROMBIN TIME: CPT | Performed by: EMERGENCY MEDICINE

## 2018-11-06 PROCEDURE — 99284 EMERGENCY DEPT VISIT MOD MDM: CPT

## 2018-11-06 PROCEDURE — 93005 ELECTROCARDIOGRAM TRACING: CPT | Performed by: EMERGENCY MEDICINE

## 2018-11-06 PROCEDURE — 80053 COMPREHEN METABOLIC PANEL: CPT | Performed by: EMERGENCY MEDICINE

## 2018-11-06 PROCEDURE — 84484 ASSAY OF TROPONIN QUANT: CPT | Performed by: EMERGENCY MEDICINE

## 2018-11-06 PROCEDURE — 71045 X-RAY EXAM CHEST 1 VIEW: CPT

## 2018-11-06 PROCEDURE — 93005 ELECTROCARDIOGRAM TRACING: CPT

## 2018-11-06 PROCEDURE — 85025 COMPLETE CBC W/AUTO DIFF WBC: CPT | Performed by: EMERGENCY MEDICINE

## 2018-11-06 RX ORDER — ASPIRIN 325 MG
325 TABLET ORAL ONCE
Status: COMPLETED | OUTPATIENT
Start: 2018-11-06 | End: 2018-11-06

## 2018-11-06 RX ADMIN — ASPIRIN 325 MG: 325 TABLET, COATED ORAL at 16:17

## 2018-11-06 NOTE — ED TRIAGE NOTES
Pt states he has a hx of PE and recently went on an 8 hour road trip. Pt began experiencing right leg pain and shortness of breath today.

## 2018-11-06 NOTE — DISCHARGE INSTRUCTIONS
Follow up with pcp for re evaluation , continue with cumadin, return if the pain is worsening or is having swelling/increased chest pain

## 2018-11-06 NOTE — ED PROVIDER NOTES
Subjective   53 years old male with history of multiple DVTs and PEs currently on Coumadin presented in the ER with a chief complaint of right thigh pain and mild chest tightness since yesterday.  Patient is worried about getting another blood clot.  Patient reports she had a 8 hour from [place] 2 days ago.  Pain is mild to moderate dull aching in the right anterior thigh with no radiation, no aggravating or relieving factors, intermittent, not associated with any swelling.  Since morning he is having mild tightness in the chest as well with minimal difficulty breathing.  Patient reports she had similar symptom last time and he was having DVT and PE.  He was off of Coumadin at that time.  But he is taking it regularly this time.  No chest pain.  No palpitations.  No fever or chills.  No cough.        History provided by:  Patient      Review of Systems   Constitutional: Negative for chills and fever.   HENT: Negative for congestion, nosebleeds, postnasal drip and sore throat.    Eyes: Negative for redness.   Respiratory: Positive for chest tightness.    Cardiovascular: Negative for chest pain and palpitations.   Gastrointestinal: Negative for abdominal pain, nausea and vomiting.   Genitourinary: Negative for flank pain.   Musculoskeletal: Positive for myalgias. Negative for back pain.   Skin: Negative for color change.   Neurological: Negative for syncope and headaches.   Psychiatric/Behavioral: Negative for agitation.       Past Medical History:   Diagnosis Date   • H/O echocardiogram 03/31/2015    Technically a difficult study. Overall LV contractility normal with Ef of 60-65%.Mild LVH and diastolic relaxation abnormality of left ventricle.Mild aortic root dilatation and mild left atrial dilatation.RV mildly dilated   • Hypertension    • Long term (current) use of anticoagulants    • Personal history of PE (pulmonary embolism)    • Pulmonary embolism without acute cor pulmonale (CMS/HCC)    • Pulmonary embolus  (CMS/HCC)    • Secondary hypercoagulability disorder (CMS/HCC)        Allergies   Allergen Reactions   • Penicillins        Past Surgical History:   Procedure Laterality Date   • CHOLECYSTECTOMY         Family History   Problem Relation Age of Onset   • Hypertension Other    • Diabetes Father    • Heart failure Father    • Lung cancer Father    • Bleeding Disorder Neg Hx    • Stroke Neg Hx    • Pulmonary embolism Neg Hx        Social History     Social History   • Marital status: Single     Social History Main Topics   • Smoking status: Never Smoker   • Smokeless tobacco: Never Used   • Alcohol use No   • Drug use: No   • Sexual activity: Defer     Other Topics Concern   • Not on file     Social History Narrative    He is a [employer].            Objective   Physical Exam   Constitutional: He is oriented to person, place, and time. He appears well-developed and well-nourished.   HENT:   Head: Normocephalic and atraumatic.   Nose: Nose normal.   Mouth/Throat: Oropharynx is clear and moist.   Eyes: Conjunctivae are normal.   Neck: Normal range of motion. Neck supple.   Cardiovascular: Normal rate, regular rhythm and normal heart sounds.    Pulmonary/Chest: Effort normal and breath sounds normal. No respiratory distress. He has no wheezes. He has no rales.   Abdominal: Soft. There is no tenderness.   Musculoskeletal: Normal range of motion. He exhibits no tenderness (minimal tenderness right medial thigh, no redness/erythema ).   Neurological: He is alert and oriented to person, place, and time.   Skin: Skin is warm and dry. Capillary refill takes less than 2 seconds.   Psychiatric: He has a normal mood and affect.   Nursing note and vitals reviewed.      ECG 12 Lead    Date/Time: 11/6/2018 3:10 PM  Performed by: DIANNA JURADO  Authorized by: DIANNA JURADO   Interpreted by physician  Rhythm: sinus rhythm  Rate: normal  QRS axis: normal  Conduction: conduction normal  ST Segments: ST segments normal  T Waves: T waves  normal  Other findings: LVH with strain  Clinical impression: non-specific ECG                 ED Course                  MDM  Number of Diagnoses or Management Options  Pain of right thigh:   Diagnosis management comments: 53 years old is evaluated for right thigh swelling and moderate chest tightness after a long road trip with previous history of multiple DVTs and PEs.  He has no DVT.  D-dimer is negative with therapeutic INR I do not think patient needs CTA chest.  Has negative troponin and his tightness is going on since yesterday, do not think he needs another troponin.  Patient does not seem to be in shortness of breath at all.  He was very anxious with his previous history of DVT and PE.  I do not think patient needs any other workup and is being discharged with outpatient follow-up.       Amount and/or Complexity of Data Reviewed  Clinical lab tests: ordered and reviewed  Tests in the radiology section of CPT®: ordered and reviewed      Labs Reviewed   COMPREHENSIVE METABOLIC PANEL - Abnormal; Notable for the following:        Result Value    Sodium 135 (*)     All other components within normal limits   PROTIME-INR - Abnormal; Notable for the following:     Protime 22.8 (*)     INR 2.11 (*)     All other components within normal limits    Narrative:     Therapeutic range for most indications is 2.0-3.0 INR,  or 2.5-3.5 for mechanical heart valves.   D-DIMER, QUANTITATIVE - Normal    Narrative:     Dimer values <500 ng/ml FEU are FDA approved as aid in diagnosis of deep venous thrombosis and pulmonary embolism.  This test should not be used in an exclusion strategy with pretest probability alone.    A recent guideline regarding diagnosis for pulmonary thromboembolism recommends an adjusted exclusion criterion of age x 10 ng/ml FEU for patients >50 years of age ([facility] Intern Med 2015; 163: 701-711).   TROPONIN (IN-HOUSE) - Normal   CBC WITH AUTO DIFFERENTIAL - Normal   RAINBOW DRAW    Narrative:     The  following orders were created for panel order [place] Draw.  Procedure                               Abnormality         Status                     ---------                               -----------         ------                     Light Blue Top[[id number]]                                   Final result               Green Top (Gel)[[id number]]                                  In process                 Lavender Top[[id number]]                                     Final result               Gold Top - SST[[id number]]                                   In process                   Please view results for these tests on the individual orders.   CBC AND DIFFERENTIAL    Narrative:     The following orders were created for panel order CBC & Differential.  Procedure                               Abnormality         Status                     ---------                               -----------         ------                     CBC Auto Differential[[id number]]        Normal              Final result                 Please view results for these tests on the individual orders.   LIGHT BLUE TOP   LAVENDER TOP   GREEN TOP   GOLD TOP - SST   EXTRA TUBES    Narrative:     The following orders were created for panel order Extra Tubes.  Procedure                               Abnormality         Status                     ---------                               -----------         ------                     Light Blue Top[[id number]]                                   In process                 Lavender Top[[id number]]                                     In process                   Please view results for these tests on the individual orders.   LIGHT BLUE TOP   LAVENDER TOP       Xr Chest 1 View    Result Date: 11/6/2018  Narrative: EXAM:         Radiograph(s), Chest VIEWS:   Frontal  ; 1     DATE/TIME:  11/6/2018 4:12 PM CST            INDICATION:   sob  COMPARISON:  CXR: none         FINDINGS:         - lines/tubes:    none   -  cardiac:         size within normal limits       - mediastinum: contour within normal limits       - lungs:         no focal air space process, pulmonary interstitial edema, nodule(s)/mass           - pleura:         no evidence of  fluid                - osseous:         unremarkable for age                - misc.:        Impression: CONCLUSION:    1. No evidence of an active cardiopulmonary process.                                                  Electronically signed by:  KARMA Santamaria MD  11/6/2018 4:13 PM CST Workstation: [id number]    Us Venous Doppler Lower Extremity Bilateral (duplex)    Result Date: 11/6/2018  Narrative: . EXAMINATION:  Ultrasound, venous, lower extremity CLINICAL INDICATION / HISTORY:   THIGH PAIN , MORE ON THE RIGHT, SOB , MULTIPLE DVTs in past  COMPARISON:  none TECHNIQUE:  Bilateral lower extremity(ies)                         serial axial graded compression technique                         grayscale, color flow, spectral and Doppler FINDINGS: Imaged vessels:    - common femoral vein (CFV)    - deep femoral vein (FV) (formally called superficial femoral vein (SFV))    - profunda femoral vein (PFV) (cephalad portion)    - popliteal vein (PV)   - posterior tibial vein (PTV)     - greater saphenous vein (GSV) (non-contiguous segments)    Unless otherwise indicated, all of the above described vessels were freely compressible and demonstrated spontaneous and phasic flow as well as appropriate flow with augmentation.    .      Impression: CONCLUSION:  1. Negative examination - no evidence of deep venous thrombosis within the femoral-popliteal system of the bilateral lower extremity(ies).          Electronically signed by:  KARMA Santamaria MD  11/6/2018 3:52 PM CST Workstation: [id number]          Final diagnoses:   Pain of right thigh   Chest discomfort            Adan Hooper MD  11/06/18 0748

## 2018-11-28 ENCOUNTER — DOCUMENTATION (OUTPATIENT)
Dept: CARDIAC SURGERY | Facility: CLINIC | Age: 53
End: 2018-11-28

## 2018-12-27 ENCOUNTER — ANTICOAGULATION VISIT (OUTPATIENT)
Dept: CARDIAC SURGERY | Facility: CLINIC | Age: 53
End: 2018-12-27

## 2018-12-27 VITALS — HEART RATE: 86 BPM | DIASTOLIC BLOOD PRESSURE: 93 MMHG | OXYGEN SATURATION: 95 % | SYSTOLIC BLOOD PRESSURE: 142 MMHG

## 2018-12-27 DIAGNOSIS — I82.4Z1 ACUTE DEEP VEIN THROMBOSIS (DVT) OF DISTAL VEIN OF RIGHT LOWER EXTREMITY (HCC): ICD-10-CM

## 2018-12-27 DIAGNOSIS — Z79.01 LONG TERM CURRENT USE OF ANTICOAGULANT THERAPY: ICD-10-CM

## 2018-12-27 DIAGNOSIS — I26.99 PE (PULMONARY THROMBOEMBOLISM) (HCC): ICD-10-CM

## 2018-12-27 DIAGNOSIS — I26.99 PULMONARY EMBOLISM, BILATERAL (HCC): ICD-10-CM

## 2018-12-27 DIAGNOSIS — Z71.89 ENCOUNTER FOR ANTICOAGULATION DISCUSSION AND COUNSELING: ICD-10-CM

## 2018-12-27 LAB — INR PPP: 1.8 (ref 0.9–1.1)

## 2018-12-27 PROCEDURE — 99211 OFF/OP EST MAY X REQ PHY/QHP: CPT | Performed by: NURSE PRACTITIONER

## 2018-12-27 PROCEDURE — 85610 PROTHROMBIN TIME: CPT | Performed by: NURSE PRACTITIONER

## 2018-12-27 RX ORDER — WARFARIN SODIUM 7.5 MG/1
7.5 TABLET ORAL
Qty: 35 TABLET | Refills: 1 | Status: SHIPPED | OUTPATIENT
Start: 2018-12-27 | End: 2019-01-29 | Stop reason: SDUPTHER

## 2018-12-27 NOTE — PROGRESS NOTES
Pt denies med changes or bleeding problems. Pt states he ran out of coumadin and missed last nights dose. Dose adjusted today only and pt instructed to hold green veggies for 2 days; pt verbalized. Patient instructed regarding medication; results given and questions answered. Nutritional counseling given.  Dietary factors affecting therapy addressed.  Patient instructed to monitor for excessive bruising or bleeding. Will recheck next month.         This document has been electronically signed by MARYANN Rivero @ on December 27, 2018 2:33 PM

## 2019-01-29 ENCOUNTER — ANTICOAGULATION VISIT (OUTPATIENT)
Dept: CARDIAC SURGERY | Facility: CLINIC | Age: 54
End: 2019-01-29

## 2019-01-29 VITALS — HEART RATE: 94 BPM | OXYGEN SATURATION: 98 % | SYSTOLIC BLOOD PRESSURE: 132 MMHG | DIASTOLIC BLOOD PRESSURE: 88 MMHG

## 2019-01-29 DIAGNOSIS — Z71.89 ENCOUNTER FOR ANTICOAGULATION DISCUSSION AND COUNSELING: ICD-10-CM

## 2019-01-29 DIAGNOSIS — Z79.01 LONG TERM CURRENT USE OF ANTICOAGULANT THERAPY: ICD-10-CM

## 2019-01-29 DIAGNOSIS — I82.4Z1 ACUTE DEEP VEIN THROMBOSIS (DVT) OF DISTAL VEIN OF RIGHT LOWER EXTREMITY (HCC): ICD-10-CM

## 2019-01-29 DIAGNOSIS — I26.99 PULMONARY EMBOLISM, BILATERAL (HCC): ICD-10-CM

## 2019-01-29 DIAGNOSIS — I26.99 PE (PULMONARY THROMBOEMBOLISM) (HCC): ICD-10-CM

## 2019-01-29 LAB — INR PPP: 1.5 (ref 0.9–1.1)

## 2019-01-29 PROCEDURE — 85610 PROTHROMBIN TIME: CPT | Performed by: NURSE PRACTITIONER

## 2019-01-29 PROCEDURE — 99211 OFF/OP EST MAY X REQ PHY/QHP: CPT | Performed by: NURSE PRACTITIONER

## 2019-01-29 RX ORDER — WARFARIN SODIUM 7.5 MG/1
7.5 TABLET ORAL
Qty: 35 TABLET | Refills: 1 | Status: SHIPPED | OUTPATIENT
Start: 2019-01-29 | End: 2019-02-22 | Stop reason: SDUPTHER

## 2019-01-29 NOTE — PROGRESS NOTES
Pt denies med changes or bleeding problems. Pt states he has been eating a salad nearly everyday as he is eating healthier. Dose adjusted and pt instructed to hold green veggies for 3 days; pt verbalized. Pt was advised if he wishes to continue eating higher vitamin K foods we will need to increase his warfarin dosing; pt verbalized. Patient instructed regarding medication; results given and questions answered. Nutritional counseling given.  Dietary factors affecting therapy addressed.  Patient instructed to monitor for excessive bruising or bleeding.         This document has been electronically signed by MARYANN Rivero @ on January 29, 2019 4:06 PM

## 2019-02-05 ENCOUNTER — ANTICOAGULATION VISIT (OUTPATIENT)
Dept: CARDIAC SURGERY | Facility: CLINIC | Age: 54
End: 2019-02-05

## 2019-02-05 VITALS — HEART RATE: 80 BPM | SYSTOLIC BLOOD PRESSURE: 132 MMHG | DIASTOLIC BLOOD PRESSURE: 92 MMHG

## 2019-02-05 DIAGNOSIS — Z79.01 LONG TERM CURRENT USE OF ANTICOAGULANT THERAPY: ICD-10-CM

## 2019-02-05 DIAGNOSIS — I26.99 PULMONARY EMBOLISM, BILATERAL (HCC): ICD-10-CM

## 2019-02-05 LAB — INR PPP: 2.1 (ref 0.9–1.1)

## 2019-02-05 PROCEDURE — 85610 PROTHROMBIN TIME: CPT | Performed by: NURSE PRACTITIONER

## 2019-02-05 NOTE — PROGRESS NOTES
Today's INR is 2.1.   Pt states no changes to meds or diet.  No bleeding issues.  Pt will resume his usual coumadin dose and recheck INR in two wks.  Pt verbalizes.  Patient instructed regarding medication; results given and questions answered. Nutritional counseling given.  Dietary factors affecting therapy addressed.  Patient instructed to monitor for excessive bruising or bleeding.        This document has been electronically signed by Princess Mckeon, MARYANN @ on February 5, 2019 3:44 PM

## 2019-02-22 ENCOUNTER — ANTICOAGULATION VISIT (OUTPATIENT)
Dept: CARDIAC SURGERY | Facility: CLINIC | Age: 54
End: 2019-02-22

## 2019-02-22 VITALS — DIASTOLIC BLOOD PRESSURE: 94 MMHG | HEART RATE: 72 BPM | SYSTOLIC BLOOD PRESSURE: 142 MMHG

## 2019-02-22 DIAGNOSIS — I26.99 PULMONARY EMBOLISM, BILATERAL (HCC): ICD-10-CM

## 2019-02-22 DIAGNOSIS — I82.4Z1 ACUTE DEEP VEIN THROMBOSIS (DVT) OF DISTAL VEIN OF RIGHT LOWER EXTREMITY (HCC): ICD-10-CM

## 2019-02-22 DIAGNOSIS — I26.99 PE (PULMONARY THROMBOEMBOLISM) (HCC): ICD-10-CM

## 2019-02-22 DIAGNOSIS — Z79.01 LONG TERM CURRENT USE OF ANTICOAGULANT THERAPY: ICD-10-CM

## 2019-02-22 DIAGNOSIS — Z71.89 ENCOUNTER FOR ANTICOAGULATION DISCUSSION AND COUNSELING: ICD-10-CM

## 2019-02-22 LAB — INR PPP: 2.5 (ref 0.9–1.1)

## 2019-02-22 PROCEDURE — 85610 PROTHROMBIN TIME: CPT | Performed by: NURSE PRACTITIONER

## 2019-02-22 RX ORDER — WARFARIN SODIUM 7.5 MG/1
7.5 TABLET ORAL
Qty: 35 TABLET | Refills: 1 | Status: SHIPPED | OUTPATIENT
Start: 2019-02-22

## 2019-02-22 NOTE — PROGRESS NOTES
Today's INR is 2.5. Denies any new medications or bleeding issues. Denies any s/s of blood clot. PT instructed to continue same dose and Coumadin friendly diet. PT will be seen in 4 weeks. Patient instructed regarding medication; results given and questions answered. Nutritional counseling given.  Dietary factors affecting therapy addressed.  Patient instructed to monitor for excessive bruising or bleeding. PT verbalizes understanding.         This document has been electronically signed by Princess Mckeon, MARYANN @ on February 22, 2019 4:06 PM

## 2019-03-22 ENCOUNTER — DOCUMENTATION (OUTPATIENT)
Dept: CARDIAC SURGERY | Facility: CLINIC | Age: 54
End: 2019-03-22

## 2019-04-23 ENCOUNTER — DOCUMENTATION (OUTPATIENT)
Dept: CARDIAC SURGERY | Facility: CLINIC | Age: 54
End: 2019-04-23

## 2019-05-15 ENCOUNTER — ANTICOAGULATION VISIT (OUTPATIENT)
Dept: CARDIAC SURGERY | Facility: CLINIC | Age: 54
End: 2019-05-15

## 2019-05-15 ENCOUNTER — DOCUMENTATION (OUTPATIENT)
Dept: CARDIAC SURGERY | Facility: CLINIC | Age: 54
End: 2019-05-15

## 2019-05-15 DIAGNOSIS — Z79.01 LONG TERM CURRENT USE OF ANTICOAGULANT THERAPY: ICD-10-CM

## 2019-05-15 DIAGNOSIS — I26.99 PULMONARY EMBOLISM, BILATERAL (HCC): ICD-10-CM

## 2019-09-23 ENCOUNTER — APPOINTMENT (OUTPATIENT)
Dept: GENERAL RADIOLOGY | Facility: HOSPITAL | Age: 54
End: 2019-09-23

## 2019-09-23 ENCOUNTER — APPOINTMENT (OUTPATIENT)
Dept: MRI IMAGING | Facility: HOSPITAL | Age: 54
End: 2019-09-23

## 2019-09-23 ENCOUNTER — APPOINTMENT (OUTPATIENT)
Dept: CT IMAGING | Facility: HOSPITAL | Age: 54
End: 2019-09-23

## 2019-09-23 ENCOUNTER — HOSPITAL ENCOUNTER (EMERGENCY)
Facility: HOSPITAL | Age: 54
Discharge: HOME | End: 2019-09-23
Attending: EMERGENCY MEDICINE | Admitting: EMERGENCY MEDICINE

## 2019-09-23 VITALS
SYSTOLIC BLOOD PRESSURE: 161 MMHG | WEIGHT: 297.9 LBS | OXYGEN SATURATION: 97 % | BODY MASS INDEX: 36.28 KG/M2 | HEART RATE: 67 BPM | TEMPERATURE: 98 F | DIASTOLIC BLOOD PRESSURE: 92 MMHG | HEIGHT: 76 IN | RESPIRATION RATE: 18 BRPM

## 2019-09-23 DIAGNOSIS — R07.9 CHEST PAIN, UNSPECIFIED TYPE: ICD-10-CM

## 2019-09-23 DIAGNOSIS — R42 VERTIGO: Primary | ICD-10-CM

## 2019-09-23 LAB
ALBUMIN SERPL-MCNC: 4.3 G/DL (ref 3.5–5.2)
ALBUMIN/GLOB SERPL: 1.3 G/DL
ALP SERPL-CCNC: 65 U/L (ref 39–117)
ALT SERPL W P-5'-P-CCNC: 29 U/L (ref 1–41)
ANION GAP SERPL CALCULATED.3IONS-SCNC: 12 MMOL/L (ref 5–15)
AST SERPL-CCNC: 35 U/L (ref 1–40)
BACTERIA UR QL AUTO: ABNORMAL /HPF
BASOPHILS # BLD AUTO: 0.03 10*3/MM3 (ref 0–0.2)
BASOPHILS NFR BLD AUTO: 0.4 % (ref 0–1.5)
BILIRUB SERPL-MCNC: 0.9 MG/DL (ref 0.2–1.2)
BILIRUB UR QL STRIP: NEGATIVE
BUN BLD-MCNC: 16 MG/DL (ref 6–20)
BUN/CREAT SERPL: 18.4 (ref 7–25)
CALCIUM SPEC-SCNC: 8.9 MG/DL (ref 8.6–10.5)
CHLORIDE SERPL-SCNC: 104 MMOL/L (ref 98–107)
CLARITY UR: CLEAR
CO2 SERPL-SCNC: 23 MMOL/L (ref 22–29)
COLOR UR: YELLOW
CREAT BLD-MCNC: 0.87 MG/DL (ref 0.76–1.27)
DEPRECATED RDW RBC AUTO: 39.6 FL (ref 37–54)
EOSINOPHIL # BLD AUTO: 0.12 10*3/MM3 (ref 0–0.4)
EOSINOPHIL NFR BLD AUTO: 1.7 % (ref 0.3–6.2)
ERYTHROCYTE [DISTWIDTH] IN BLOOD BY AUTOMATED COUNT: 12 % (ref 12.3–15.4)
GFR SERPL CREATININE-BSD FRML MDRD: 91 ML/MIN/1.73
GLOBULIN UR ELPH-MCNC: 3.4 GM/DL
GLUCOSE BLD-MCNC: 112 MG/DL (ref 65–99)
GLUCOSE UR STRIP-MCNC: NEGATIVE MG/DL
HCT VFR BLD AUTO: 42.6 % (ref 37.5–51)
HGB BLD-MCNC: 14.6 G/DL (ref 13–17.7)
HGB UR QL STRIP.AUTO: NEGATIVE
HOLD SPECIMEN: NORMAL
HOLD SPECIMEN: NORMAL
HYALINE CASTS UR QL AUTO: ABNORMAL /LPF
IMM GRANULOCYTES # BLD AUTO: 0.03 10*3/MM3 (ref 0–0.05)
IMM GRANULOCYTES NFR BLD AUTO: 0.4 % (ref 0–0.5)
INR PPP: 2.39 (ref 0.8–1.2)
KETONES UR QL STRIP: ABNORMAL
LEUKOCYTE ESTERASE UR QL STRIP.AUTO: NEGATIVE
LYMPHOCYTES # BLD AUTO: 2.33 10*3/MM3 (ref 0.7–3.1)
LYMPHOCYTES NFR BLD AUTO: 32.9 % (ref 19.6–45.3)
MCH RBC QN AUTO: 30.5 PG (ref 26.6–33)
MCHC RBC AUTO-ENTMCNC: 34.3 G/DL (ref 31.5–35.7)
MCV RBC AUTO: 89.1 FL (ref 79–97)
MONOCYTES # BLD AUTO: 0.78 10*3/MM3 (ref 0.1–0.9)
MONOCYTES NFR BLD AUTO: 11 % (ref 5–12)
NEUTROPHILS # BLD AUTO: 3.8 10*3/MM3 (ref 1.7–7)
NEUTROPHILS NFR BLD AUTO: 53.6 % (ref 42.7–76)
NITRITE UR QL STRIP: NEGATIVE
NRBC BLD AUTO-RTO: 0 /100 WBC (ref 0–0.2)
NT-PROBNP SERPL-MCNC: 36.9 PG/ML (ref 5–900)
PH UR STRIP.AUTO: 7 [PH] (ref 5–9)
PLATELET # BLD AUTO: 242 10*3/MM3 (ref 140–450)
PMV BLD AUTO: 10.2 FL (ref 6–12)
POTASSIUM BLD-SCNC: 4.1 MMOL/L (ref 3.5–5.2)
PROT SERPL-MCNC: 7.7 G/DL (ref 6–8.5)
PROT UR QL STRIP: ABNORMAL
PROTHROMBIN TIME: 25.8 SECONDS (ref 11.1–15.3)
RBC # BLD AUTO: 4.78 10*6/MM3 (ref 4.14–5.8)
RBC # UR: ABNORMAL /HPF
REF LAB TEST METHOD: ABNORMAL
SODIUM BLD-SCNC: 139 MMOL/L (ref 136–145)
SP GR UR STRIP: 1.03 (ref 1–1.03)
SQUAMOUS #/AREA URNS HPF: ABNORMAL /HPF
TROPONIN T SERPL-MCNC: <0.01 NG/ML (ref 0–0.03)
TROPONIN T SERPL-MCNC: <0.01 NG/ML (ref 0–0.03)
UROBILINOGEN UR QL STRIP: ABNORMAL
WBC NRBC COR # BLD: 7.09 10*3/MM3 (ref 3.4–10.8)
WBC UR QL AUTO: ABNORMAL /HPF
WHOLE BLOOD HOLD SPECIMEN: NORMAL
WHOLE BLOOD HOLD SPECIMEN: NORMAL

## 2019-09-23 PROCEDURE — 85610 PROTHROMBIN TIME: CPT | Performed by: EMERGENCY MEDICINE

## 2019-09-23 PROCEDURE — 83880 ASSAY OF NATRIURETIC PEPTIDE: CPT | Performed by: EMERGENCY MEDICINE

## 2019-09-23 PROCEDURE — 70551 MRI BRAIN STEM W/O DYE: CPT

## 2019-09-23 PROCEDURE — 93005 ELECTROCARDIOGRAM TRACING: CPT | Performed by: EMERGENCY MEDICINE

## 2019-09-23 PROCEDURE — 84484 ASSAY OF TROPONIN QUANT: CPT | Performed by: EMERGENCY MEDICINE

## 2019-09-23 PROCEDURE — 85025 COMPLETE CBC W/AUTO DIFF WBC: CPT | Performed by: EMERGENCY MEDICINE

## 2019-09-23 PROCEDURE — 99285 EMERGENCY DEPT VISIT HI MDM: CPT

## 2019-09-23 PROCEDURE — 81001 URINALYSIS AUTO W/SCOPE: CPT | Performed by: EMERGENCY MEDICINE

## 2019-09-23 PROCEDURE — 93010 ELECTROCARDIOGRAM REPORT: CPT | Performed by: INTERNAL MEDICINE

## 2019-09-23 PROCEDURE — 71045 X-RAY EXAM CHEST 1 VIEW: CPT

## 2019-09-23 PROCEDURE — 70450 CT HEAD/BRAIN W/O DYE: CPT

## 2019-09-23 PROCEDURE — 80053 COMPREHEN METABOLIC PANEL: CPT | Performed by: EMERGENCY MEDICINE

## 2019-09-23 RX ORDER — SODIUM CHLORIDE 0.9 % (FLUSH) 0.9 %
10 SYRINGE (ML) INJECTION AS NEEDED
Status: DISCONTINUED | OUTPATIENT
Start: 2019-09-23 | End: 2019-09-23 | Stop reason: HOSPADM

## 2019-09-23 RX ORDER — ASPIRIN 81 MG/1
81 TABLET ORAL DAILY
Qty: 30 TABLET | Refills: 0 | Status: SHIPPED | OUTPATIENT
Start: 2019-09-23 | End: 2022-11-09

## 2019-09-23 RX ORDER — ONDANSETRON 4 MG/1
4 TABLET, ORALLY DISINTEGRATING ORAL EVERY 6 HOURS PRN
Qty: 15 TABLET | Refills: 0 | Status: SHIPPED | OUTPATIENT
Start: 2019-09-23 | End: 2022-10-26

## 2019-09-23 RX ORDER — MECLIZINE HYDROCHLORIDE 25 MG/1
25 TABLET ORAL 3 TIMES DAILY PRN
Qty: 30 TABLET | Refills: 0 | Status: SHIPPED | OUTPATIENT
Start: 2019-09-23 | End: 2022-10-26

## 2019-09-23 RX ORDER — MECLIZINE HYDROCHLORIDE 25 MG/1
25 TABLET ORAL ONCE
Status: COMPLETED | OUTPATIENT
Start: 2019-09-23 | End: 2019-09-23

## 2019-09-23 RX ADMIN — MECLIZINE HYDROCHLORIDE 25 MG: 25 TABLET ORAL at 12:26

## 2019-09-23 NOTE — ED PROVIDER NOTES
Subjective   Patient is a 54-year-old male presents to the emergency department complaint of dizziness and some mild hypertension over the past day and half.  Patient notes the symptoms initially started when the patient was having some horseplay with some children worries pertaining to hear their bikes, he gave the bike back obviously and as he did so he felt a sudden twinge of dizziness as it turned around suddenly.  Patient states that this can of continued episodically, mostly with head movements and walking around.  Patient lie down but had it recur again this morning.  Patient has had no similar symptoms in the past.  Patient is on anticoagulation at this time for chronic pulmonary embolism.  Patient noted some  chest discomfort he experienced this morning that felt atypical for him as well.  Patient at this time without any chest discomfort.  Patient comes in with borderline blood pressures at 157/97.  No nausea vomiting no chest pain is noted.  No bowel or bladder changes.            Review of Systems   Constitutional: Negative.  Negative for appetite change, chills and fever.   HENT: Negative.  Negative for congestion.    Eyes: Negative.  Negative for photophobia and visual disturbance.   Respiratory: Negative.  Negative for cough, chest tightness and shortness of breath.    Cardiovascular: Negative.  Negative for chest pain and palpitations.   Gastrointestinal: Negative.  Negative for abdominal pain, constipation, diarrhea, nausea and vomiting.   Endocrine: Negative.    Genitourinary: Negative.  Negative for decreased urine volume, dysuria, flank pain and hematuria.   Musculoskeletal: Negative.  Negative for arthralgias, back pain, myalgias, neck pain and neck stiffness.   Skin: Negative.  Negative for pallor.   Neurological: Positive for dizziness, speech difficulty and light-headedness. Negative for syncope, weakness, numbness and headaches.   Psychiatric/Behavioral: Negative.  Negative for confusion and  suicidal ideas. The patient is not nervous/anxious.    All other systems reviewed and are negative.      Past Medical History:   Diagnosis Date   • H/O echocardiogram 03/31/2015    Technically a difficult study. Overall LV contractility normal with Ef of 60-65%.Mild LVH and diastolic relaxation abnormality of left ventricle.Mild aortic root dilatation and mild left atrial dilatation.RV mildly dilated   • Hypertension    • Long term (current) use of anticoagulants    • Personal history of PE (pulmonary embolism)    • Pulmonary embolism without acute cor pulmonale (CMS/HCC)    • Pulmonary embolus (CMS/HCC)    • Secondary hypercoagulability disorder (CMS/HCC)        Allergies   Allergen Reactions   • Penicillins        Past Surgical History:   Procedure Laterality Date   • CHOLECYSTECTOMY         Family History   Problem Relation Age of Onset   • Hypertension Other    • Diabetes Father    • Heart failure Father    • Lung cancer Father    • Bleeding Disorder Neg Hx    • Stroke Neg Hx    • Pulmonary embolism Neg Hx        Social History     Socioeconomic History   • Marital status: Single     Spouse name: Not on file   • Number of children: Not on file   • Years of education: Not on file   • Highest education level: Not on file   Tobacco Use   • Smoking status: Never Smoker   • Smokeless tobacco: Never Used   Substance and Sexual Activity   • Alcohol use: No   • Drug use: No   • Sexual activity: Defer   Social History Narrative    He is a [employer].            Objective   Physical Exam   Constitutional: He is oriented to person, place, and time. He appears well-developed and well-nourished. No distress.   HENT:   Head: Normocephalic and atraumatic.   Eyes: Conjunctivae and EOM are normal.   No nystagmus is noted.   Neck: Normal range of motion. Neck supple. No JVD present.   Cardiovascular: Normal rate, regular rhythm, normal heart sounds and intact distal pulses. Exam reveals no gallop and no friction rub.   No murmur  heard.  Pulmonary/Chest: Effort normal. No respiratory distress. He has no wheezes. He has no rales. He exhibits no tenderness.   Abdominal: Soft. Bowel sounds are normal. He exhibits no distension and no mass. There is no tenderness. There is no rebound and no guarding.   Musculoskeletal: Normal range of motion.   Neurological: He is alert and oriented to person, place, and time. He displays normal reflexes. No cranial nerve deficit or sensory deficit. He exhibits normal muscle tone.   Normal nonfocal neurologic exam.   Skin: Skin is warm and dry. Capillary refill takes less than 2 seconds.   Psychiatric: He has a normal mood and affect. His behavior is normal. Judgment and thought content normal.   Nursing note and vitals reviewed.      Procedures           ED Course      Labs Reviewed   COMPREHENSIVE METABOLIC PANEL - Abnormal; Notable for the following components:       Result Value    Glucose 112 (*)     All other components within normal limits    Narrative:     GFR Normal >60  Chronic Kidney Disease <60  Kidney Failure <15   URINALYSIS W/ MICROSCOPIC IF INDICATED (NO CULTURE) - Abnormal; Notable for the following components:    Ketones, UA Trace (*)     Protein, UA 30 mg/dL (1+) (*)     All other components within normal limits   PROTIME-INR - Abnormal; Notable for the following components:    Protime 25.8 (*)     INR 2.39 (*)     All other components within normal limits    Narrative:     Therapeutic range for most indications is 2.0-3.0 INR,  or 2.5-3.5 for mechanical heart valves.   CBC WITH AUTO DIFFERENTIAL - Abnormal; Notable for the following components:    RDW 12.0 (*)     All other components within normal limits   URINALYSIS, MICROSCOPIC ONLY - Abnormal; Notable for the following components:    RBC, UA 0-2 (*)     All other components within normal limits   TROPONIN (IN-HOUSE) - Normal    Narrative:     Troponin T Reference Range:  <= 0.03 ng/mL-   Negative for AMI  >0.03 ng/mL-     Abnormal for  myocardial necrosis.  Clinicians would have to utilize clinical acumen, EKG, Troponin and serial changes to determine if it is an Acute Myocardial Infarction or myocardial injury due to an underlying chronic condition.    TROPONIN (IN-HOUSE) - Normal    Narrative:     Troponin T Reference Range:  <= 0.03 ng/mL-   Negative for AMI  >0.03 ng/mL-     Abnormal for myocardial necrosis.  Clinicians would have to utilize clinical acumen, EKG, Troponin and serial changes to determine if it is an Acute Myocardial Infarction or myocardial injury due to an underlying chronic condition.    BNP (IN-HOUSE) - Normal    Narrative:     Among patients with dyspnea, NT-proBNP is highly sensitive for the detection of acute congestive heart failure. In addition NT-proBNP of <300 pg/ml effectively rules out acute congestive heart failure with 99% negative predictive value.   RAINBOW DRAW    Narrative:     The following orders were created for panel order [place] Draw.  Procedure                               Abnormality         Status                     ---------                               -----------         ------                     Light Blue Top[[id number]]                                   Final result               Green Top (Gel)[[id number]]                                  Final result               Lavender Top[[id number]]                                     Final result               Gold Top - SST[[id number]]                                   Final result                 Please view results for these tests on the individual orders.   POCT PROTIME - INR   CBC AND DIFFERENTIAL    Narrative:     The following orders were created for panel order CBC & Differential.  Procedure                               Abnormality         Status                     ---------                               -----------         ------                     CBC Auto Differential[[id number]]        Abnormal            Final result                  Please view results for these tests on the individual orders.   LIGHT BLUE TOP   GREEN TOP   LAVENDER TOP   GOLD TOP - SST       MRI Brain Without Contrast   Final Result   1. Unremarkable MRI of the brain without contrast.    2. Left maxillary sinus retention cyst.               Electronically signed by:  Finn Boogie MD  9/23/2019 3:12 PM   CDT Workstation: [id number]      CT Head Without Contrast   Final Result   Unremarkable Cranial CT without contrast.         Electronically signed by:  Finn Boogie MD  9/23/2019 1:37 PM   CDT Workstation: [id number]      XR Chest 1 View   Final Result   No active disease.         Electronically signed by:  Finn Boogie MD  9/23/2019 12:29 PM   CDT Workstation: [id number]        Patient with negative CT MRI for acute pathology.  Patient with vertiginous type symptoms.  Patient be started on medications for the same.  Patient has nonspecific chest pain earlier in the day.  Negative markers x2.  Patient will be referred for outpatient stress testing and start on aspirin.            MDM    Final diagnoses:   Vertigo   Chest pain, unspecified type              Omar Romo MD  09/23/19 [id number]

## 2019-09-23 NOTE — ED NOTES
Patient states that he was having difficulty speaking earlier in the day, only lasting for a moment and has since resolved. MD stated he wanted to see patient before this RN placed protocol orders.      Pamela Reddy, RN  09/23/19 [id number]

## 2020-07-09 ENCOUNTER — HOSPITAL ENCOUNTER (OUTPATIENT)
Dept: GENERAL RADIOLOGY | Facility: HOSPITAL | Age: 55
Discharge: HOME | End: 2020-07-09
Admitting: PHYSICIAN ASSISTANT

## 2020-07-09 ENCOUNTER — HOSPITAL ENCOUNTER (OUTPATIENT)
Dept: GENERAL RADIOLOGY | Facility: HOSPITAL | Age: 55
Discharge: HOME | End: 2020-07-09

## 2020-07-09 DIAGNOSIS — M25.562 LEFT KNEE PAIN, UNSPECIFIED CHRONICITY: ICD-10-CM

## 2020-07-09 PROCEDURE — 73560 X-RAY EXAM OF KNEE 1 OR 2: CPT

## 2020-07-09 PROCEDURE — 73562 X-RAY EXAM OF KNEE 3: CPT

## 2020-07-26 ENCOUNTER — APPOINTMENT (OUTPATIENT)
Dept: GENERAL RADIOLOGY | Facility: HOSPITAL | Age: 55
End: 2020-07-26

## 2020-07-26 ENCOUNTER — APPOINTMENT (OUTPATIENT)
Dept: CT IMAGING | Facility: HOSPITAL | Age: 55
End: 2020-07-26

## 2020-07-26 ENCOUNTER — HOSPITAL ENCOUNTER (EMERGENCY)
Facility: HOSPITAL | Age: 55
Discharge: HOME | End: 2020-07-26
Attending: EMERGENCY MEDICINE | Admitting: EMERGENCY MEDICINE

## 2020-07-26 VITALS
RESPIRATION RATE: 18 BRPM | HEART RATE: 64 BPM | BODY MASS INDEX: 39.31 KG/M2 | TEMPERATURE: 97.9 F | OXYGEN SATURATION: 97 % | HEIGHT: 74 IN | SYSTOLIC BLOOD PRESSURE: 136 MMHG | WEIGHT: 306.3 LBS | DIASTOLIC BLOOD PRESSURE: 77 MMHG

## 2020-07-26 DIAGNOSIS — R42 POSTURAL DIZZINESS WITH NEAR SYNCOPE: Primary | ICD-10-CM

## 2020-07-26 DIAGNOSIS — R55 POSTURAL DIZZINESS WITH NEAR SYNCOPE: Primary | ICD-10-CM

## 2020-07-26 LAB
ALBUMIN SERPL-MCNC: 4.4 G/DL (ref 3.5–5.2)
ALBUMIN/GLOB SERPL: 1.5 G/DL
ALP SERPL-CCNC: 69 U/L (ref 39–117)
ALT SERPL W P-5'-P-CCNC: 24 U/L (ref 1–41)
AMPHET+METHAMPHET UR QL: NEGATIVE
AMPHETAMINES UR QL: NEGATIVE
ANION GAP SERPL CALCULATED.3IONS-SCNC: 14 MMOL/L (ref 5–15)
AST SERPL-CCNC: 20 U/L (ref 1–40)
BACTERIA UR QL AUTO: ABNORMAL /HPF
BARBITURATES UR QL SCN: NEGATIVE
BASOPHILS # BLD AUTO: 0.06 10*3/MM3 (ref 0–0.2)
BASOPHILS NFR BLD AUTO: 0.5 % (ref 0–1.5)
BENZODIAZ UR QL SCN: NEGATIVE
BILIRUB SERPL-MCNC: 0.8 MG/DL (ref 0–1.2)
BILIRUB UR QL STRIP: NEGATIVE
BUN SERPL-MCNC: 17 MG/DL (ref 6–20)
BUN/CREAT SERPL: 18.5 (ref 7–25)
BUPRENORPHINE SERPL-MCNC: NEGATIVE NG/ML
CALCIUM SPEC-SCNC: 9.3 MG/DL (ref 8.6–10.5)
CANNABINOIDS SERPL QL: NEGATIVE
CHLORIDE SERPL-SCNC: 104 MMOL/L (ref 98–107)
CLARITY UR: CLEAR
CO2 SERPL-SCNC: 23 MMOL/L (ref 22–29)
COCAINE UR QL: NEGATIVE
COLOR UR: ABNORMAL
CREAT SERPL-MCNC: 0.92 MG/DL (ref 0.76–1.27)
DEPRECATED RDW RBC AUTO: 40.6 FL (ref 37–54)
EOSINOPHIL # BLD AUTO: 0.04 10*3/MM3 (ref 0–0.4)
EOSINOPHIL NFR BLD AUTO: 0.3 % (ref 0.3–6.2)
ERYTHROCYTE [DISTWIDTH] IN BLOOD BY AUTOMATED COUNT: 12.5 % (ref 12.3–15.4)
ETHANOL BLD-MCNC: <10 MG/DL (ref 0–10)
ETHANOL UR QL: <0.01 %
GFR SERPL CREATININE-BSD FRML MDRD: 85 ML/MIN/1.73
GLOBULIN UR ELPH-MCNC: 3 GM/DL
GLUCOSE SERPL-MCNC: 136 MG/DL (ref 65–99)
GLUCOSE UR STRIP-MCNC: NEGATIVE MG/DL
HCT VFR BLD AUTO: 45 % (ref 37.5–51)
HGB BLD-MCNC: 15.5 G/DL (ref 13–17.7)
HGB UR QL STRIP.AUTO: NEGATIVE
HOLD SPECIMEN: NORMAL
HOLD SPECIMEN: NORMAL
HYALINE CASTS UR QL AUTO: ABNORMAL /LPF
IMM GRANULOCYTES # BLD AUTO: 0.18 10*3/MM3 (ref 0–0.05)
IMM GRANULOCYTES NFR BLD AUTO: 1.4 % (ref 0–0.5)
INR PPP: 1.62 (ref 0.8–1.2)
KETONES UR QL STRIP: ABNORMAL
LEUKOCYTE ESTERASE UR QL STRIP.AUTO: NEGATIVE
LYMPHOCYTES # BLD AUTO: 4.57 10*3/MM3 (ref 0.7–3.1)
LYMPHOCYTES NFR BLD AUTO: 36 % (ref 19.6–45.3)
MCH RBC QN AUTO: 31.1 PG (ref 26.6–33)
MCHC RBC AUTO-ENTMCNC: 34.4 G/DL (ref 31.5–35.7)
MCV RBC AUTO: 90.2 FL (ref 79–97)
METHADONE UR QL SCN: NEGATIVE
MONOCYTES # BLD AUTO: 1.56 10*3/MM3 (ref 0.1–0.9)
MONOCYTES NFR BLD AUTO: 12.3 % (ref 5–12)
NEUTROPHILS NFR BLD AUTO: 49.5 % (ref 42.7–76)
NEUTROPHILS NFR BLD AUTO: 6.28 10*3/MM3 (ref 1.7–7)
NITRITE UR QL STRIP: NEGATIVE
NRBC BLD AUTO-RTO: 0 /100 WBC (ref 0–0.2)
NT-PROBNP SERPL-MCNC: 42.9 PG/ML (ref 0–900)
OPIATES UR QL: NEGATIVE
OXYCODONE UR QL SCN: NEGATIVE
PCP UR QL SCN: NEGATIVE
PH UR STRIP.AUTO: 5.5 [PH] (ref 5–9)
PLATELET # BLD AUTO: 300 10*3/MM3 (ref 140–450)
PMV BLD AUTO: 10.9 FL (ref 6–12)
POTASSIUM SERPL-SCNC: 3.3 MMOL/L (ref 3.5–5.2)
PROPOXYPH UR QL: NEGATIVE
PROT SERPL-MCNC: 7.4 G/DL (ref 6–8.5)
PROT UR QL STRIP: ABNORMAL
PROTHROMBIN TIME: 20.1 SECONDS (ref 11.1–15.3)
RBC # BLD AUTO: 4.99 10*6/MM3 (ref 4.14–5.8)
RBC # UR: ABNORMAL /HPF
REF LAB TEST METHOD: ABNORMAL
SODIUM SERPL-SCNC: 141 MMOL/L (ref 136–145)
SP GR UR STRIP: 1.03 (ref 1–1.03)
SQUAMOUS #/AREA URNS HPF: ABNORMAL /HPF
TRICYCLICS UR QL SCN: NEGATIVE
TROPONIN T SERPL-MCNC: <0.01 NG/ML (ref 0–0.03)
TROPONIN T SERPL-MCNC: <0.01 NG/ML (ref 0–0.03)
UROBILINOGEN UR QL STRIP: ABNORMAL
WBC # BLD AUTO: 12.69 10*3/MM3 (ref 3.4–10.8)
WBC UR QL AUTO: ABNORMAL /HPF
WHOLE BLOOD HOLD SPECIMEN: NORMAL
WHOLE BLOOD HOLD SPECIMEN: NORMAL

## 2020-07-26 PROCEDURE — 71045 X-RAY EXAM CHEST 1 VIEW: CPT

## 2020-07-26 PROCEDURE — 81001 URINALYSIS AUTO W/SCOPE: CPT | Performed by: EMERGENCY MEDICINE

## 2020-07-26 PROCEDURE — 93010 ELECTROCARDIOGRAM REPORT: CPT | Performed by: INTERNAL MEDICINE

## 2020-07-26 PROCEDURE — 80053 COMPREHEN METABOLIC PANEL: CPT | Performed by: EMERGENCY MEDICINE

## 2020-07-26 PROCEDURE — 83880 ASSAY OF NATRIURETIC PEPTIDE: CPT | Performed by: EMERGENCY MEDICINE

## 2020-07-26 PROCEDURE — 80307 DRUG TEST PRSMV CHEM ANLYZR: CPT | Performed by: EMERGENCY MEDICINE

## 2020-07-26 PROCEDURE — 84484 ASSAY OF TROPONIN QUANT: CPT | Performed by: EMERGENCY MEDICINE

## 2020-07-26 PROCEDURE — 99285 EMERGENCY DEPT VISIT HI MDM: CPT

## 2020-07-26 PROCEDURE — 0 IOPAMIDOL PER 1 ML: Performed by: EMERGENCY MEDICINE

## 2020-07-26 PROCEDURE — 71275 CT ANGIOGRAPHY CHEST: CPT

## 2020-07-26 PROCEDURE — 85025 COMPLETE CBC W/AUTO DIFF WBC: CPT | Performed by: EMERGENCY MEDICINE

## 2020-07-26 PROCEDURE — 93005 ELECTROCARDIOGRAM TRACING: CPT | Performed by: EMERGENCY MEDICINE

## 2020-07-26 PROCEDURE — 85610 PROTHROMBIN TIME: CPT | Performed by: EMERGENCY MEDICINE

## 2020-07-26 PROCEDURE — 70450 CT HEAD/BRAIN W/O DYE: CPT

## 2020-07-26 RX ORDER — SODIUM CHLORIDE 0.9 % (FLUSH) 0.9 %
10 SYRINGE (ML) INJECTION AS NEEDED
Status: DISCONTINUED | OUTPATIENT
Start: 2020-07-26 | End: 2020-07-26 | Stop reason: HOSPADM

## 2020-07-26 RX ADMIN — SODIUM CHLORIDE 1000 ML: 900 INJECTION, SOLUTION INTRAVENOUS at 11:54

## 2020-07-26 RX ADMIN — IOPAMIDOL 73 ML: 755 INJECTION, SOLUTION INTRAVENOUS at 13:40

## 2020-07-26 NOTE — DISCHARGE INSTRUCTIONS
Minimize stratus activity this weekend.  Drink plenty of fluids. Return with any new or worsening symptoms, or any concerns.

## 2020-07-26 NOTE — ED TRIAGE NOTES
Pt presents to ED via EMS with C/O dizziness, light headedness, and feeling like he was going to pass out. Pt reports he was outside for approx 45 minutes. Pt states he has hx of PE's

## 2020-07-26 NOTE — ED PROVIDER NOTES
Subjective   Patient presents emergency department with near syncope.  Patient states he is never had the symptoms before.  Patient usually works third shift.  Patient was out working in his yard approximately 45 minutes when he began to get lightheaded.  He describes a feeling as if he was stood up really quickly and felt like he was going to blackout.  There is no spinning of the room or dizziness in that manner.  Patient has had no nausea.  Patient with the symptoms lasted for a prolonged period over the course of 10 to 20 minutes.  Patient has never had anything happen that long before so he felt like he should get medical care.  Patient at this time is feeling better in terms of the dizziness and not having that feeling of passing out.  Patient denies any drug or alcohol use.  Patient is on Coumadin for recurrent pulmonary embolism.  Patient states he has been on his medications as he should.  Patient denies any chest pain or palpitations.  No radiation of his symptoms.  No nausea vomiting.  Patient been eating drinking normally before this.  No focal neurologic deficits          Review of Systems   Constitutional: Negative.  Negative for appetite change, chills and fever.   HENT: Negative.  Negative for congestion.    Eyes: Negative.  Negative for photophobia and visual disturbance.   Respiratory: Negative.  Negative for cough, chest tightness and shortness of breath.    Cardiovascular: Negative.  Negative for chest pain and palpitations.   Gastrointestinal: Negative.  Negative for abdominal pain, constipation, diarrhea, nausea and vomiting.   Endocrine: Negative.    Genitourinary: Negative.  Negative for decreased urine volume, dysuria, flank pain and hematuria.   Musculoskeletal: Negative.  Negative for arthralgias, back pain, myalgias, neck pain and neck stiffness.   Skin: Negative.  Negative for pallor.   Neurological: Positive for dizziness and syncope. Negative for weakness, light-headedness, numbness  and headaches.   Psychiatric/Behavioral: Negative.  Negative for confusion and suicidal ideas. The patient is not nervous/anxious.    All other systems reviewed and are negative.      Past Medical History:   Diagnosis Date   • H/O echocardiogram 03/31/2015    Technically a difficult study. Overall LV contractility normal with Ef of 60-65%.Mild LVH and diastolic relaxation abnormality of left ventricle.Mild aortic root dilatation and mild left atrial dilatation.RV mildly dilated   • Hypertension    • Long term (current) use of anticoagulants    • Personal history of PE (pulmonary embolism)    • Pulmonary embolism without acute cor pulmonale (CMS/HCC)    • Pulmonary embolus (CMS/HCC)    • Secondary hypercoagulability disorder (CMS/HCC)        Allergies   Allergen Reactions   • Penicillins        Past Surgical History:   Procedure Laterality Date   • CHOLECYSTECTOMY         Family History   Problem Relation Age of Onset   • Hypertension Other    • Diabetes Father    • Heart failure Father    • Lung cancer Father    • Bleeding Disorder Neg Hx    • Stroke Neg Hx    • Pulmonary embolism Neg Hx        Social History     Socioeconomic History   • Marital status: Single     Spouse name: Not on file   • Number of children: Not on file   • Years of education: Not on file   • Highest education level: Not on file   Tobacco Use   • Smoking status: Never Smoker   • Smokeless tobacco: Never Used   Substance and Sexual Activity   • Alcohol use: No   • Drug use: No   • Sexual activity: Defer   Social History Narrative    He is a [employer].            Objective   Physical Exam   Constitutional: He is oriented to person, place, and time. He appears well-developed and well-nourished. No distress.   HENT:   Head: Normocephalic and atraumatic.   Eyes: Conjunctivae and EOM are normal.   Neck: Normal range of motion. Neck supple. No JVD present.   Cardiovascular: Normal rate, regular rhythm, normal heart sounds and intact distal pulses.  Exam reveals no gallop and no friction rub.   No murmur heard.  Pulmonary/Chest: Effort normal. No respiratory distress. He has no wheezes. He has no rales. He exhibits no tenderness.   Abdominal: Soft. Bowel sounds are normal. He exhibits no distension and no mass. There is no tenderness. There is no rebound and no guarding.   Musculoskeletal: Normal range of motion.   Neurological: He is alert and oriented to person, place, and time.   Skin: Skin is warm and dry. Capillary refill takes less than 2 seconds.   Psychiatric: He has a normal mood and affect. His behavior is normal. Judgment and thought content normal.   Nursing note and vitals reviewed.      Procedures           ED Course                                 Labs Reviewed   COMPREHENSIVE METABOLIC PANEL - Abnormal; Notable for the following components:       Result Value    Glucose 136 (*)     Potassium 3.3 (*)     All other components within normal limits    Narrative:     GFR Normal >60  Chronic Kidney Disease <60  Kidney Failure <15     PROTIME-INR - Abnormal; Notable for the following components:    Protime 20.1 (*)     INR 1.62 (*)     All other components within normal limits    Narrative:     Therapeutic range for most indications is 2.0-3.0 INR,  or 2.5-3.5 for mechanical heart valves.   CBC WITH AUTO DIFFERENTIAL - Abnormal; Notable for the following components:    WBC 12.69 (*)     Monocyte % 12.3 (*)     Immature Grans % 1.4 (*)     Lymphocytes, Absolute 4.57 (*)     Monocytes, Absolute 1.56 (*)     Immature Grans, Absolute 0.18 (*)     All other components within normal limits   URINALYSIS W/ MICROSCOPIC IF INDICATED (NO CULTURE) - Abnormal; Notable for the following components:    Ketones, UA Trace (*)     Protein, UA 30 mg/dL (1+) (*)     All other components within normal limits   URINALYSIS, MICROSCOPIC ONLY - Abnormal; Notable for the following components:    RBC, UA 3-5 (*)     All other components within normal limits   TROPONIN  (IN-HOUSE) - Normal    Narrative:     Troponin T Reference Range:  <= 0.03 ng/mL-   Negative for AMI  >0.03 ng/mL-     Abnormal for myocardial necrosis.  Clinicians would have to utilize clinical acumen, EKG, Troponin and serial changes to determine if it is an Acute Myocardial Infarction or myocardial injury due to an underlying chronic condition.       Results may be falsely decreased if patient taking Biotin.     TROPONIN (IN-HOUSE) - Normal    Narrative:     Troponin T Reference Range:  <= 0.03 ng/mL-   Negative for AMI  >0.03 ng/mL-     Abnormal for myocardial necrosis.  Clinicians would have to utilize clinical acumen, EKG, Troponin and serial changes to determine if it is an Acute Myocardial Infarction or myocardial injury due to an underlying chronic condition.       Results may be falsely decreased if patient taking Biotin.     BNP (IN-HOUSE) - Normal    Narrative:     Among patients with dyspnea, NT-proBNP is highly sensitive for the detection of acute congestive heart failure. In addition NT-proBNP of <300 pg/ml effectively rules out acute congestive heart failure with 99% negative predictive value.    Results may be falsely decreased if patient taking Biotin.     URINE DRUG SCREEN - Normal    Narrative:     Cutoff For Drugs Screened:    Amphetamines               500 ng/ml  Barbiturates               200 ng/ml  Benzodiazepines            150 ng/ml  Cocaine                    150 ng/ml  Methadone                  200 ng/ml  Opiates                    100 ng/ml  Phencyclidine               25 ng/ml  THC                            50 ng/ml  Methamphetamine            500 ng/ml  Tricyclic Antidepressants  300 ng/ml  Oxycodone                  100 ng/ml  Propoxyphene               300 ng/ml  Buprenorphine               10 ng/ml    The normal value for all drugs tested is negative. This report includes unconfirmed screening results, with the cutoff values listed, to be used for medical treatment purposes only.   Unconfirmed results must not be used for non-medical purposes such as employment or legal testing.  Clinical consideration should be applied to any drug of abuse test, particularly when unconfirmed results are used.     RAINBOW DRAW    Narrative:     The following orders were created for panel order [facility] Draw.  Procedure                               Abnormality         Status                     ---------                               -----------         ------                     Light Blue Top[[id number]]                                   Final result               Green Top (Gel)[[id number]]                                  Final result               Lavender Top[[id number]]                                     Final result               Gold Top - SST[[id number]]                                   Final result                 Please view results for these tests on the individual orders.   ETHANOL   CBC AND DIFFERENTIAL    Narrative:     The following orders were created for panel order CBC & Differential.  Procedure                               Abnormality         Status                     ---------                               -----------         ------                     CBC Auto Differential[[id number]]        Abnormal            Final result                 Please view results for these tests on the individual orders.   LIGHT BLUE TOP   GREEN TOP   LAVENDER TOP   GOLD TOP - SST       CT Angiogram Chest   Final Result      There are no acute or significant findings in the chest      Electronically signed by:  Vinicius Mujica MD  7/26/2020 2:04 PM CDT   Workstation: [id number]      CT Head Without Contrast   Final Result      There is no acute intracranial abnormality.      If clinical concern exists regarding an acute ischemic/vascular   etiology being responsible for patient's symptomatology, an MRI   of the brain is more sensitive than the current study, in ruling   out such a possibility.          Electronically signed by:  Vinicius Mujica MD  7/26/2020 12:19 PM   CDT Workstation: [id number]      XR Chest 1 View   Final Result   Negative single view chest      Electronically signed by:  Arnold Power MD  7/26/2020 11:58 AM CDT   Workstation: [id number]        Patient with postural dizziness which is improved.  Patient has received fluids in the ED.  No acute findings.  No chest pain.  No evidence of PE.  Plan interval outpatient follow-up and recheck.            MDM    Final diagnoses:   Postural dizziness with near syncope            Omar Romo MD  07/26/20 1518

## 2021-07-12 ENCOUNTER — HOSPITAL ENCOUNTER (OUTPATIENT)
Dept: ULTRASOUND IMAGING | Facility: HOSPITAL | Age: 56
Discharge: HOME | End: 2021-07-12
Admitting: NURSE PRACTITIONER

## 2021-07-12 DIAGNOSIS — M79.605 LEFT LEG PAIN: ICD-10-CM

## 2021-07-12 PROCEDURE — 93971 EXTREMITY STUDY: CPT

## 2021-11-08 ENCOUNTER — APPOINTMENT (OUTPATIENT)
Dept: GENERAL RADIOLOGY | Facility: HOSPITAL | Age: 56
End: 2021-11-08

## 2021-11-08 ENCOUNTER — HOSPITAL ENCOUNTER (EMERGENCY)
Facility: HOSPITAL | Age: 56
Discharge: HOME | End: 2021-11-08
Attending: EMERGENCY MEDICINE | Admitting: EMERGENCY MEDICINE

## 2021-11-08 VITALS
BODY MASS INDEX: 36.53 KG/M2 | WEIGHT: 300 LBS | TEMPERATURE: 98.2 F | DIASTOLIC BLOOD PRESSURE: 90 MMHG | SYSTOLIC BLOOD PRESSURE: 163 MMHG | HEIGHT: 76 IN | OXYGEN SATURATION: 96 % | HEART RATE: 68 BPM | RESPIRATION RATE: 18 BRPM

## 2021-11-08 DIAGNOSIS — R07.9 CHEST PAIN, UNSPECIFIED TYPE: Primary | ICD-10-CM

## 2021-11-08 DIAGNOSIS — M54.9 ACUTE BACK PAIN, UNSPECIFIED BACK LOCATION, UNSPECIFIED BACK PAIN LATERALITY: ICD-10-CM

## 2021-11-08 LAB
ALBUMIN SERPL-MCNC: 4.6 G/DL (ref 3.5–5.2)
ALBUMIN/GLOB SERPL: 1.8 G/DL
ALP SERPL-CCNC: 70 U/L (ref 39–117)
ALT SERPL W P-5'-P-CCNC: 30 U/L (ref 1–41)
ANION GAP SERPL CALCULATED.3IONS-SCNC: 11 MMOL/L (ref 5–15)
AST SERPL-CCNC: 27 U/L (ref 1–40)
BASOPHILS # BLD AUTO: 0.07 10*3/MM3 (ref 0–0.2)
BASOPHILS NFR BLD AUTO: 0.8 % (ref 0–1.5)
BILIRUB SERPL-MCNC: 1.1 MG/DL (ref 0–1.2)
BUN SERPL-MCNC: 11 MG/DL (ref 6–20)
BUN/CREAT SERPL: 11.8 (ref 7–25)
CALCIUM SPEC-SCNC: 8.8 MG/DL (ref 8.6–10.5)
CHLORIDE SERPL-SCNC: 103 MMOL/L (ref 98–107)
CO2 SERPL-SCNC: 23 MMOL/L (ref 22–29)
CREAT SERPL-MCNC: 0.93 MG/DL (ref 0.76–1.27)
D-DIMER, QUANTITATIVE (MAD,POW, STR): <270 NG/ML (FEU) (ref 0–470)
DEPRECATED RDW RBC AUTO: 42.5 FL (ref 37–54)
EOSINOPHIL # BLD AUTO: 0.11 10*3/MM3 (ref 0–0.4)
EOSINOPHIL NFR BLD AUTO: 1.3 % (ref 0.3–6.2)
ERYTHROCYTE [DISTWIDTH] IN BLOOD BY AUTOMATED COUNT: 12.7 % (ref 12.3–15.4)
GFR SERPL CREATININE-BSD FRML MDRD: 84 ML/MIN/1.73
GLOBULIN UR ELPH-MCNC: 2.6 GM/DL
GLUCOSE SERPL-MCNC: 102 MG/DL (ref 65–99)
HCT VFR BLD AUTO: 43.3 % (ref 37.5–51)
HGB BLD-MCNC: 14.9 G/DL (ref 13–17.7)
HOLD SPECIMEN: NORMAL
HOLD SPECIMEN: NORMAL
IMM GRANULOCYTES # BLD AUTO: 0.16 10*3/MM3 (ref 0–0.05)
IMM GRANULOCYTES NFR BLD AUTO: 1.9 % (ref 0–0.5)
INR PPP: 1.88 (ref 0.8–1.2)
LIPASE SERPL-CCNC: 17 U/L (ref 13–60)
LYMPHOCYTES # BLD AUTO: 2.82 10*3/MM3 (ref 0.7–3.1)
LYMPHOCYTES NFR BLD AUTO: 34.1 % (ref 19.6–45.3)
MCH RBC QN AUTO: 31.6 PG (ref 26.6–33)
MCHC RBC AUTO-ENTMCNC: 34.4 G/DL (ref 31.5–35.7)
MCV RBC AUTO: 91.7 FL (ref 79–97)
MONOCYTES # BLD AUTO: 1.15 10*3/MM3 (ref 0.1–0.9)
MONOCYTES NFR BLD AUTO: 13.9 % (ref 5–12)
NEUTROPHILS NFR BLD AUTO: 3.96 10*3/MM3 (ref 1.7–7)
NEUTROPHILS NFR BLD AUTO: 48 % (ref 42.7–76)
NRBC BLD AUTO-RTO: 0 /100 WBC (ref 0–0.2)
NT-PROBNP SERPL-MCNC: 51.7 PG/ML (ref 0–900)
PLATELET # BLD AUTO: 227 10*3/MM3 (ref 140–450)
PMV BLD AUTO: 10.3 FL (ref 6–12)
POTASSIUM SERPL-SCNC: 3.6 MMOL/L (ref 3.5–5.2)
PROT SERPL-MCNC: 7.2 G/DL (ref 6–8.5)
PROTHROMBIN TIME: 21.2 SECONDS (ref 11.1–15.3)
RBC # BLD AUTO: 4.72 10*6/MM3 (ref 4.14–5.8)
SODIUM SERPL-SCNC: 137 MMOL/L (ref 136–145)
TROPONIN T SERPL-MCNC: <0.01 NG/ML (ref 0–0.03)
TROPONIN T SERPL-MCNC: <0.01 NG/ML (ref 0–0.03)
WBC # BLD AUTO: 8.27 10*3/MM3 (ref 3.4–10.8)
WHOLE BLOOD HOLD SPECIMEN: NORMAL
WHOLE BLOOD HOLD SPECIMEN: NORMAL

## 2021-11-08 PROCEDURE — 93010 ELECTROCARDIOGRAM REPORT: CPT | Performed by: INTERNAL MEDICINE

## 2021-11-08 PROCEDURE — 99283 EMERGENCY DEPT VISIT LOW MDM: CPT

## 2021-11-08 PROCEDURE — 71045 X-RAY EXAM CHEST 1 VIEW: CPT

## 2021-11-08 PROCEDURE — 84484 ASSAY OF TROPONIN QUANT: CPT | Performed by: EMERGENCY MEDICINE

## 2021-11-08 PROCEDURE — 80053 COMPREHEN METABOLIC PANEL: CPT | Performed by: EMERGENCY MEDICINE

## 2021-11-08 PROCEDURE — 85610 PROTHROMBIN TIME: CPT | Performed by: EMERGENCY MEDICINE

## 2021-11-08 PROCEDURE — 93005 ELECTROCARDIOGRAM TRACING: CPT | Performed by: EMERGENCY MEDICINE

## 2021-11-08 PROCEDURE — 85379 FIBRIN DEGRADATION QUANT: CPT | Performed by: EMERGENCY MEDICINE

## 2021-11-08 PROCEDURE — 83690 ASSAY OF LIPASE: CPT | Performed by: EMERGENCY MEDICINE

## 2021-11-08 PROCEDURE — 93005 ELECTROCARDIOGRAM TRACING: CPT

## 2021-11-08 PROCEDURE — 83880 ASSAY OF NATRIURETIC PEPTIDE: CPT | Performed by: EMERGENCY MEDICINE

## 2021-11-08 PROCEDURE — 85025 COMPLETE CBC W/AUTO DIFF WBC: CPT | Performed by: EMERGENCY MEDICINE

## 2021-11-08 RX ORDER — SODIUM CHLORIDE 0.9 % (FLUSH) 0.9 %
10 SYRINGE (ML) INJECTION AS NEEDED
Status: DISCONTINUED | OUTPATIENT
Start: 2021-11-08 | End: 2021-11-08 | Stop reason: HOSPADM

## 2021-11-08 RX ORDER — LABETALOL HYDROCHLORIDE 5 MG/ML
20 INJECTION, SOLUTION INTRAVENOUS ONCE
Status: DISCONTINUED | OUTPATIENT
Start: 2021-11-08 | End: 2021-11-08 | Stop reason: HOSPADM

## 2021-11-08 RX ORDER — ASPIRIN 81 MG/1
324 TABLET, CHEWABLE ORAL ONCE
Status: COMPLETED | OUTPATIENT
Start: 2021-11-08 | End: 2021-11-08

## 2021-11-08 RX ADMIN — ASPIRIN 324 MG: 81 TABLET, CHEWABLE ORAL at 14:22

## 2021-11-08 RX ADMIN — NITROGLYCERIN 1 INCH: 20 OINTMENT TOPICAL at 14:24

## 2021-11-08 NOTE — ED NOTES
EKG was completed in triage by this tech and signed by ED MD Romo.      Edgar Liu  11/08/21 0702

## 2021-11-08 NOTE — ED PROVIDER NOTES
Subjective   Patient presents emergency department with complaint of chest pain and back pain.  Patient states he has had the symptoms for about 2 days which feels almost like a burning sensation in his chest radiating towards his back and low back.  Patient notes no real worsening with exertion though he has not been exerting himself.  Patient does find it hard to get comfortable.  Patient has no known cardiac disease.  Patient has had a problem with blood clots in the past.  Patient has had PEs x2 with DVT, but patient has been on Coumadin, for life at this point.  Patient states that the symptoms were subtle enough that he was questioning whether he should come in, though today it started radiating to the left arm so he thought he should get this worked up.  Patient is non-smoker nondrinker.  Patient notes no diaphoresis or shortness of breath with this.  Patient states that he occasionally feels like he needs to catch up breath with the discomfort, but is been in no distress.          Review of Systems   Constitutional: Negative.  Negative for appetite change, chills and fever.   HENT: Negative.  Negative for congestion.    Eyes: Negative.  Negative for photophobia and visual disturbance.   Respiratory: Positive for chest tightness. Negative for cough and shortness of breath.    Cardiovascular: Positive for chest pain. Negative for palpitations.   Gastrointestinal: Negative.  Negative for abdominal pain, constipation, diarrhea, nausea and vomiting.   Endocrine: Negative.    Genitourinary: Negative.  Negative for decreased urine volume, dysuria, flank pain and hematuria.   Musculoskeletal: Negative.  Negative for arthralgias, back pain, myalgias, neck pain and neck stiffness.   Skin: Negative.  Negative for pallor.   Neurological: Negative.  Negative for dizziness, syncope, weakness, light-headedness, numbness and headaches.   Psychiatric/Behavioral: Negative.  Negative for confusion and suicidal ideas. The  patient is not nervous/anxious.    All other systems reviewed and are negative.      Past Medical History:   Diagnosis Date   • H/O echocardiogram 03/31/2015    Technically a difficult study. Overall LV contractility normal with Ef of 60-65%.Mild LVH and diastolic relaxation abnormality of left ventricle.Mild aortic root dilatation and mild left atrial dilatation.RV mildly dilated   • Hypertension    • Long term (current) use of anticoagulants    • Personal history of PE (pulmonary embolism)    • Pulmonary embolism without acute cor pulmonale (HCC)    • Pulmonary embolus (HCC)    • Secondary hypercoagulability disorder (HCC)        Allergies   Allergen Reactions   • Penicillins        Past Surgical History:   Procedure Laterality Date   • CHOLECYSTECTOMY         Family History   Problem Relation Age of Onset   • Hypertension Other    • Diabetes Father    • Heart failure Father    • Lung cancer Father    • Bleeding Disorder Neg Hx    • Stroke Neg Hx    • Pulmonary embolism Neg Hx        Social History     Socioeconomic History   • Marital status: Single   Tobacco Use   • Smoking status: Never Smoker   • Smokeless tobacco: Never Used   Substance and Sexual Activity   • Alcohol use: No   • Drug use: No   • Sexual activity: Defer           Objective   Physical Exam  Vitals and nursing note reviewed.   Constitutional:       General: He is not in acute distress.     Appearance: He is well-developed. He is not ill-appearing or toxic-appearing.   HENT:      Head: Normocephalic and atraumatic.   Eyes:      Conjunctiva/sclera: Conjunctivae normal.      Pupils: Pupils are equal, round, and reactive to light.   Neck:      Vascular: No JVD.   Cardiovascular:      Rate and Rhythm: Normal rate and regular rhythm.      Heart sounds: Normal heart sounds. No murmur heard.  No friction rub. No gallop.    Pulmonary:      Effort: Accessory muscle usage present. No tachypnea or respiratory distress.      Breath sounds: No decreased  breath sounds, wheezing or rales.   Chest:      Chest wall: No tenderness.   Abdominal:      General: Bowel sounds are normal. There is no distension.      Palpations: Abdomen is soft. There is no mass.      Tenderness: There is no abdominal tenderness. There is no guarding or rebound.   Musculoskeletal:         General: Normal range of motion.      Cervical back: Normal range of motion and neck supple.      Right lower leg: No edema.      Left lower leg: No edema.   Skin:     General: Skin is warm and dry.      Capillary Refill: Capillary refill takes less than 2 seconds.   Neurological:      General: No focal deficit present.      Mental Status: He is alert and oriented to person, place, and time.   Psychiatric:         Mood and Affect: Mood normal.         Behavior: Behavior normal.         Thought Content: Thought content normal.         Judgment: Judgment normal.         Procedures           ED Course  ED Course as of 11/08/21 1648   Mon Nov 08, 2021   1647 Patient with a heart score of 3.  We will refer the patient to outpatient cardiac stress testing with cardiology.  Negative troponin times 2:02 days of symptoms.  Patient has seen Dr. Martinez in the past and referred to the same. [PC]      ED Course User Index  [PC] Omar Romo MD                                 Labs Reviewed   COMPREHENSIVE METABOLIC PANEL - Abnormal; Notable for the following components:       Result Value    Glucose 102 (*)     All other components within normal limits    Narrative:     GFR Normal >60  Chronic Kidney Disease <60  Kidney Failure <15     PROTIME-INR - Abnormal; Notable for the following components:    Protime 21.2 (*)     INR 1.88 (*)     All other components within normal limits    Narrative:     Therapeutic range for most indications is 2.0-3.0 INR,  or 2.5-3.5 for mechanical heart valves.   CBC WITH AUTO DIFFERENTIAL - Abnormal; Notable for the following components:    Monocyte % 13.9 (*)     Immature Grans % 1.9  (*)     Monocytes, Absolute 1.15 (*)     Immature Grans, Absolute 0.16 (*)     All other components within normal limits   TROPONIN (IN-HOUSE) - Normal    Narrative:     Troponin T Reference Range:  <= 0.03 ng/mL-   Negative for AMI  >0.03 ng/mL-     Abnormal for myocardial necrosis.  Clinicians would have to utilize clinical acumen, EKG, Troponin and serial changes to determine if it is an Acute Myocardial Infarction or myocardial injury due to an underlying chronic condition.       Results may be falsely decreased if patient taking Biotin.     TROPONIN (IN-HOUSE) - Normal    Narrative:     Troponin T Reference Range:  <= 0.03 ng/mL-   Negative for AMI  >0.03 ng/mL-     Abnormal for myocardial necrosis.  Clinicians would have to utilize clinical acumen, EKG, Troponin and serial changes to determine if it is an Acute Myocardial Infarction or myocardial injury due to an underlying chronic condition.       Results may be falsely decreased if patient taking Biotin.     BNP (IN-HOUSE) - Normal    Narrative:     Among patients with dyspnea, NT-proBNP is highly sensitive for the detection of acute congestive heart failure. In addition NT-proBNP of <300 pg/ml effectively rules out acute congestive heart failure with 99% negative predictive value.    Results may be falsely decreased if patient taking Biotin.     D-DIMER, QUANTITATIVE - Normal    Narrative:     Dimer values <500 ng/ml FEU are FDA approved as aid in diagnosis of deep venous thrombosis and pulmonary embolism.  This test should not be used in an exclusion strategy with pretest probability alone.    A recent guideline regarding diagnosis for pulmonary thromboembolism recommends an adjusted exclusion criterion of age x 10 ng/ml FEU for patients >50 years of age ([employer] Intern Med 2015; 163: 701-711).     LIPASE - Normal   RAINBOW DRAW    Narrative:     The following orders were created for panel order [facility] Draw.  Procedure                                Abnormality         Status                     ---------                               -----------         ------                     Green Top (Gel)[[id number]]                                  Final result               Lavender Top[[id number]]                                     Final result               Gold Top - SST[[id number]]                                   Final result               Light Blue Top[[id number]]                                   Final result                 Please view results for these tests on the individual orders.   CBC AND DIFFERENTIAL    Narrative:     The following orders were created for panel order CBC & Differential.  Procedure                               Abnormality         Status                     ---------                               -----------         ------                     CBC Auto Differential[[id number]]        Abnormal            Final result                 Please view results for these tests on the individual orders.   GREEN TOP   LAVENDER TOP   GOLD TOP - SST   LIGHT BLUE TOP       XR Chest 1 View   Final Result   No evidence of active disease.      Electronically signed by:  Mg Rivas MD  11/8/2021 3:03 PM CST   Workstation: [id number]                HEART Score (for prediction of 6-week risk of major adverse cardiac event) reviewed and/or performed as part of the patient evaluation and treatment planning process.  The result associated with this review/performance is: 3       MDM    Final diagnoses:   Chest pain, unspecified type   Acute back pain, unspecified back location, unspecified back pain laterality       ED Disposition  ED Disposition     ED Disposition Condition Comment    Discharge Stable           Dede Martinez MD  [address] [place] [address] [address]  [facility] [address] [place] [id number] [phone]    Call   This week for cardiac stress testing evaluation.         Medication List      No changes were made to your prescriptions during this  visit.          Omar Romo MD  11/08/21 0508

## 2021-11-08 NOTE — ED NOTES
Patient ambulated out of ED with steady gait. No questions or concerns noted. Nitro paste removed from patient's chest prior to discharge.      Huma Mathur RN  11/08/21 7112

## 2021-11-08 NOTE — DISCHARGE INSTRUCTIONS
Followup with cardiology, please call tomorrow if you are not called with an appointment time.  In the meantime, continue current medications and return with any new or worsening symptoms, or any concerns.

## 2021-11-09 LAB
QT INTERVAL: 402 MS
QTC INTERVAL: 452 MS

## 2022-02-08 ENCOUNTER — TRANSCRIBE ORDERS (OUTPATIENT)
Dept: ORTHOPEDIC SURGERY | Facility: CLINIC | Age: 57
End: 2022-02-08

## 2022-02-08 DIAGNOSIS — M25.561 RIGHT KNEE PAIN, UNSPECIFIED CHRONICITY: Primary | ICD-10-CM

## 2022-02-09 ENCOUNTER — HOSPITAL ENCOUNTER (OUTPATIENT)
Dept: MRI IMAGING | Facility: HOSPITAL | Age: 57
Discharge: HOME | End: 2022-02-09
Admitting: FAMILY MEDICINE

## 2022-02-09 DIAGNOSIS — M25.361 INSTABILITY OF RIGHT KNEE JOINT: ICD-10-CM

## 2022-02-09 DIAGNOSIS — M25.561 ACUTE PAIN OF RIGHT KNEE: ICD-10-CM

## 2022-02-09 PROCEDURE — 73721 MRI JNT OF LWR EXTRE W/O DYE: CPT

## 2022-10-26 ENCOUNTER — OFFICE VISIT (OUTPATIENT)
Dept: GASTROENTEROLOGY | Facility: CLINIC | Age: 57
End: 2022-10-26

## 2022-10-26 VITALS
HEIGHT: 76 IN | DIASTOLIC BLOOD PRESSURE: 98 MMHG | BODY MASS INDEX: 37.9 KG/M2 | HEART RATE: 77 BPM | WEIGHT: 311.2 LBS | SYSTOLIC BLOOD PRESSURE: 180 MMHG

## 2022-10-26 DIAGNOSIS — R10.84 GENERALIZED ABDOMINAL PAIN: Primary | ICD-10-CM

## 2022-10-26 DIAGNOSIS — K92.1 MELENA: ICD-10-CM

## 2022-10-26 PROBLEM — Z86.711 PERSONAL HISTORY OF PULMONARY EMBOLISM: Status: ACTIVE | Noted: 2022-10-26

## 2022-10-26 PROBLEM — E78.5 HYPERLIPIDEMIA: Status: ACTIVE | Noted: 2022-10-26

## 2022-10-26 PROCEDURE — 99203 OFFICE O/P NEW LOW 30 MIN: CPT | Performed by: NURSE PRACTITIONER

## 2022-10-26 RX ORDER — DEXTROSE AND SODIUM CHLORIDE 5; .45 G/100ML; G/100ML
30 INJECTION, SOLUTION INTRAVENOUS CONTINUOUS PRN
Status: CANCELLED | OUTPATIENT
Start: 2022-11-14

## 2022-10-26 RX ORDER — SODIUM, POTASSIUM,MAG SULFATES 17.5-3.13G
1 SOLUTION, RECONSTITUTED, ORAL ORAL EVERY 12 HOURS
Qty: 354 ML | Refills: 0 | Status: SHIPPED | OUTPATIENT
Start: 2022-10-26 | End: 2022-11-14 | Stop reason: HOSPADM

## 2022-10-26 NOTE — PROGRESS NOTES
Chief Complaint   Patient presents with   • Abdominal Pain   • Colon Cancer Screening       Subjective    Edgardo Ugarte is a 57 y.o. male. he is being seen for consultation today at the request of Jackson Mancia MD                                                                  Assessment & Plan                                     1. Generalized abdominal pain    2. Melena      Plan; schedule patient for EGD and colonoscopy due to abdominal pain melena and mucus discharge after bowel movement.  Follow-up after test return office sooner if needed    Follow-up: Return in about 4 weeks (around 2022) for Recheck, After test.     HPI  57-year-old male presents to discuss abdominal pain started about a month ago just prior to going on a cruise he is on Coumadin for pulmonary embolism and noted some bruising on his abdomen denies any known injury or trauma.  He reports intermittent reflux for which he takes x4 does not take prescription for this.  States at onset pain radiated down to his right testicle but testicular pain has improved was seen by PCP for that.  Initially had some melena but reports stool has went back to normal color still having some mucousy discharge after bowel movement states bowel movements are 1-3 times per day have been somewhat loose since he had his gallbladder out several years ago.  Denies any known family history of colorectal cancer.  Reports both parents  of lung cancer.        Review of Systems  Review of Systems   Constitutional: Positive for fatigue. Negative for activity change, appetite change, chills, diaphoresis, fever and unexpected weight change.   HENT: Negative for sore throat and trouble swallowing.    Respiratory: Negative for shortness of breath.    Gastrointestinal: Positive for abdominal pain. Negative for abdominal distention, anal bleeding, blood  "in stool, constipation, diarrhea, nausea, rectal pain and vomiting.        Very dark stool for a few days mucous discharge at times    Musculoskeletal: Negative for arthralgias.   Skin: Negative for pallor.   Neurological: Negative for light-headedness.       /98 (BP Location: Left arm)   Pulse 77   Ht 193 cm (76\")   Wt (!) 141 kg (311 lb 3.2 oz)   BMI 37.88 kg/m²     Objective      Physical Exam  Constitutional:       Appearance: He is normal weight.   HENT:      Head: Normocephalic and atraumatic.   Pulmonary:      Effort: Pulmonary effort is normal.   Abdominal:      General: Abdomen is flat. Bowel sounds are normal. There is no distension.      Palpations: Abdomen is soft. There is no mass.      Tenderness: There is abdominal tenderness in the right upper quadrant and epigastric area.   Neurological:      Mental Status: He is alert.               The following portions of the patient's history were reviewed and updated as appropriate:   Past Medical History:   Diagnosis Date   • H/O echocardiogram 03/31/2015    Technically a difficult study. Overall LV contractility normal with Ef of 60-65%.Mild LVH and diastolic relaxation abnormality of left ventricle.Mild aortic root dilatation and mild left atrial dilatation.RV mildly dilated   • Hypertension    • Long term (current) use of anticoagulants    • Personal history of PE (pulmonary embolism)    • Pulmonary embolism without acute cor pulmonale (HCC)    • Pulmonary embolus (HCC)    • Secondary hypercoagulability disorder (HCC)      Past Surgical History:   Procedure Laterality Date   • CHOLECYSTECTOMY       Family History   Problem Relation Age of Onset   • Lung cancer Mother    • Diabetes Father    • Heart failure Father    • Lung cancer Father    • Hypertension Other    • Bleeding Disorder Neg Hx    • Stroke Neg Hx    • Pulmonary embolism Neg Hx        Allergies   Allergen Reactions   • Penicillins      Social History     Socioeconomic History   • " Marital status: Single   Tobacco Use   • Smoking status: Never   • Smokeless tobacco: Never   Substance and Sexual Activity   • Alcohol use: No   • Drug use: No   • Sexual activity: Defer     Current Medications:  Prior to Admission medications    Medication Sig Start Date End Date Taking? Authorizing Provider   amLODIPine (NORVASC) 2.5 MG tablet Take 1 tablet by mouth Daily. 10/24/18  Yes Whit Little MD   warfarin (COUMADIN) 7.5 MG tablet Take 1 tablet by mouth Daily. 2/22/19  Yes Princess Mckeon APRN   aspirin 81 MG EC tablet Take 1 tablet by mouth Daily. 9/23/19   Omar Romo MD   sodium-potassium-magnesium sulfates (Suprep Bowel Prep Kit) 17.5-3.13-1.6 GM/177ML solution oral solution Take 1 bottle by mouth Every 12 (Twelve) Hours. 10/26/22   Angela Killian APRN   meclizine (ANTIVERT) 25 MG tablet Take 1 tablet by mouth 3 (Three) Times a Day As Needed for dizziness. 9/23/19 10/26/22  Omar Romo MD   ondansetron ODT (ZOFRAN-ODT) 4 MG disintegrating tablet Take 1 tablet by mouth Every 6 (Six) Hours As Needed for Nausea or Vomiting. 9/23/19 10/26/22  Omar Romo MD     Orders placed during this encounter include:  Orders Placed This Encounter   Procedures   • Obtain Informed Consent     Standing Status:   Future     Order Specific Question:   Informed Consent Given For     Answer:   ESOPHAGOGASTRODUODENOSCOPY and Colonoscopy     ESOPHAGOGASTRODUODENOSCOPY (N/A), COLONOSCOPY (N/A)  New Medications Ordered This Visit   Medications   • sodium-potassium-magnesium sulfates (Suprep Bowel Prep Kit) 17.5-3.13-1.6 GM/177ML solution oral solution     Sig: Take 1 bottle by mouth Every 12 (Twelve) Hours.     Dispense:  354 mL     Refill:  0         Review and/or summary of lab tests, radiology, procedures, medications. Review and summary of old records and obtaining of history. The risks and benefits of my recommendations, as well as other treatment options were discussed . Any questions/concerned  were answered. Patient voiced understanding and agreement.          This document has been electronically signed by SALVATORE Moore on October 26, 2022 10:53 CDT                                               Results for orders placed or performed during the hospital encounter of 11/08/21   Gold Top - SST   Result Value Ref Range    Extra Tube Hold for add-ons.    Green Top (Gel)   Result Value Ref Range    Extra Tube Hold for add-ons.    CBC Auto Differential    Specimen: Blood   Result Value Ref Range    WBC 8.27 3.40 - 10.80 10*3/mm3    RBC 4.72 4.14 - 5.80 10*6/mm3    Hemoglobin 14.9 13.0 - 17.7 g/dL    Hematocrit 43.3 37.5 - 51.0 %    MCV 91.7 79.0 - 97.0 fL    MCH 31.6 26.6 - 33.0 pg    MCHC 34.4 31.5 - 35.7 g/dL    RDW 12.7 12.3 - 15.4 %    RDW-SD 42.5 37.0 - 54.0 fl    MPV 10.3 6.0 - 12.0 fL    Platelets 227 140 - 450 10*3/mm3    Neutrophil % 48.0 42.7 - 76.0 %    Lymphocyte % 34.1 19.6 - 45.3 %    Monocyte % 13.9 (H) 5.0 - 12.0 %    Eosinophil % 1.3 0.3 - 6.2 %    Basophil % 0.8 0.0 - 1.5 %    Immature Grans % 1.9 (H) 0.0 - 0.5 %    Neutrophils, Absolute 3.96 1.70 - 7.00 10*3/mm3    Lymphocytes, Absolute 2.82 0.70 - 3.10 10*3/mm3    Monocytes, Absolute 1.15 (H) 0.10 - 0.90 10*3/mm3    Eosinophils, Absolute 0.11 0.00 - 0.40 10*3/mm3    Basophils, Absolute 0.07 0.00 - 0.20 10*3/mm3    Immature Grans, Absolute 0.16 (H) 0.00 - 0.05 10*3/mm3    nRBC 0.0 0.0 - 0.2 /100 WBC   Lavender Top   Result Value Ref Range    Extra Tube hold for add-on    Light Blue Top   Result Value Ref Range    Extra Tube hold for add-on    Troponin    Specimen: Blood   Result Value Ref Range    Troponin T <0.010 0.000 - 0.030 ng/mL   Troponin    Specimen: Blood   Result Value Ref Range    Troponin T <0.010 0.000 - 0.030 ng/mL   Protime-INR    Specimen: Blood   Result Value Ref Range    Protime 21.2 (H) 11.1 - 15.3 Seconds    INR 1.88 (H) 0.80 - 1.20   D-dimer, Quantitative    Specimen: Blood   Result Value Ref Range    D-Dimer,  Quantitative <270 0 - 470 ng/mL (FEU)   BNP    Specimen: Blood   Result Value Ref Range    proBNP 51.7 0.0 - 900.0 pg/mL   Lipase    Specimen: Blood   Result Value Ref Range    Lipase 17 13 - 60 U/L   Comprehensive Metabolic Panel    Specimen: Blood   Result Value Ref Range    Glucose 102 (H) 65 - 99 mg/dL    BUN 11 6 - 20 mg/dL    Creatinine 0.93 0.76 - 1.27 mg/dL    Sodium 137 136 - 145 mmol/L    Potassium 3.6 3.5 - 5.2 mmol/L    Chloride 103 98 - 107 mmol/L    CO2 23.0 22.0 - 29.0 mmol/L    Calcium 8.8 8.6 - 10.5 mg/dL    Total Protein 7.2 6.0 - 8.5 g/dL    Albumin 4.60 3.50 - 5.20 g/dL    ALT (SGPT) 30 1 - 41 U/L    AST (SGOT) 27 1 - 40 U/L    Alkaline Phosphatase 70 39 - 117 U/L    Total Bilirubin 1.1 0.0 - 1.2 mg/dL    eGFR Non African Amer 84 >60 mL/min/1.73    Globulin 2.6 gm/dL    A/G Ratio 1.8 g/dL    BUN/Creatinine Ratio 11.8 7.0 - 25.0    Anion Gap 11.0 5.0 - 15.0 mmol/L   ECG 12 Lead   Result Value Ref Range    QT Interval 402 ms    QTC Interval 452 ms   Results for orders placed or performed during the hospital encounter of 07/26/20   Gold Top - SST   Result Value Ref Range    Extra Tube Hold for add-ons.    Green Top (Gel)   Result Value Ref Range    Extra Tube Hold for add-ons.    Urinalysis, Microscopic Only - Urine, Clean Catch    Specimen: Urine, Clean Catch   Result Value Ref Range    RBC, UA 3-5 (A) None Seen /HPF    WBC, UA 0-2 None Seen, 0-2, 3-5 /HPF    Bacteria, UA None Seen None Seen /HPF    Squamous Epithelial Cells, UA None Seen None Seen, 0-2 /HPF    Hyaline Casts, UA 3-6 None Seen /LPF    Methodology Automated Microscopy    Urinalysis With Microscopic If Indicated (No Culture) - Urine, Clean Catch    Specimen: Urine, Clean Catch   Result Value Ref Range    Color, UA Dark Yellow Yellow, Straw, Dark Yellow, Pauline    Appearance, UA Clear Clear    pH, UA 5.5 5.0 - 9.0    Specific Gravity, UA 1.027 1.003 - 1.030    Glucose, UA Negative Negative    Ketones, UA Trace (A) Negative    Bilirubin,  UA Negative Negative    Blood, UA Negative Negative    Protein, UA 30 mg/dL (1+) (A) Negative    Leuk Esterase, UA Negative Negative    Nitrite, UA Negative Negative    Urobilinogen, UA 0.2 E.U./dL 0.2 - 1.0 E.U./dL   CBC Auto Differential    Specimen: Blood   Result Value Ref Range    WBC 12.69 (H) 3.40 - 10.80 10*3/mm3    RBC 4.99 4.14 - 5.80 10*6/mm3    Hemoglobin 15.5 13.0 - 17.7 g/dL    Hematocrit 45.0 37.5 - 51.0 %    MCV 90.2 79.0 - 97.0 fL    MCH 31.1 26.6 - 33.0 pg    MCHC 34.4 31.5 - 35.7 g/dL    RDW 12.5 12.3 - 15.4 %    RDW-SD 40.6 37.0 - 54.0 fl    MPV 10.9 6.0 - 12.0 fL    Platelets 300 140 - 450 10*3/mm3    Neutrophil % 49.5 42.7 - 76.0 %    Lymphocyte % 36.0 19.6 - 45.3 %    Monocyte % 12.3 (H) 5.0 - 12.0 %    Eosinophil % 0.3 0.3 - 6.2 %    Basophil % 0.5 0.0 - 1.5 %    Immature Grans % 1.4 (H) 0.0 - 0.5 %    Neutrophils, Absolute 6.28 1.70 - 7.00 10*3/mm3    Lymphocytes, Absolute 4.57 (H) 0.70 - 3.10 10*3/mm3    Monocytes, Absolute 1.56 (H) 0.10 - 0.90 10*3/mm3    Eosinophils, Absolute 0.04 0.00 - 0.40 10*3/mm3    Basophils, Absolute 0.06 0.00 - 0.20 10*3/mm3    Immature Grans, Absolute 0.18 (H) 0.00 - 0.05 10*3/mm3    nRBC 0.0 0.0 - 0.2 /100 WBC     *Note: Due to a large number of results and/or encounters for the requested time period, some results have not been displayed. A complete set of results can be found in Results Review.

## 2022-11-14 ENCOUNTER — ANESTHESIA (OUTPATIENT)
Dept: GASTROENTEROLOGY | Facility: HOSPITAL | Age: 57
End: 2022-11-14

## 2022-11-14 ENCOUNTER — HOSPITAL ENCOUNTER (OUTPATIENT)
Facility: HOSPITAL | Age: 57
Setting detail: HOSPITAL OUTPATIENT SURGERY
Discharge: HOME | End: 2022-11-14
Attending: INTERNAL MEDICINE | Admitting: INTERNAL MEDICINE

## 2022-11-14 ENCOUNTER — ANESTHESIA EVENT (OUTPATIENT)
Dept: GASTROENTEROLOGY | Facility: HOSPITAL | Age: 57
End: 2022-11-14

## 2022-11-14 VITALS
BODY MASS INDEX: 36.24 KG/M2 | OXYGEN SATURATION: 96 % | HEART RATE: 83 BPM | HEIGHT: 76 IN | TEMPERATURE: 97.7 F | SYSTOLIC BLOOD PRESSURE: 129 MMHG | RESPIRATION RATE: 18 BRPM | DIASTOLIC BLOOD PRESSURE: 85 MMHG | WEIGHT: 297.62 LBS

## 2022-11-14 DIAGNOSIS — K92.1 MELENA: ICD-10-CM

## 2022-11-14 DIAGNOSIS — R10.84 GENERALIZED ABDOMINAL PAIN: ICD-10-CM

## 2022-11-14 PROCEDURE — 88342 IMHCHEM/IMCYTCHM 1ST ANTB: CPT

## 2022-11-14 PROCEDURE — 88305 TISSUE EXAM BY PATHOLOGIST: CPT

## 2022-11-14 PROCEDURE — 25010000002 PROPOFOL 10 MG/ML EMULSION: Performed by: NURSE ANESTHETIST, CERTIFIED REGISTERED

## 2022-11-14 PROCEDURE — 45380 COLONOSCOPY AND BIOPSY: CPT | Performed by: INTERNAL MEDICINE

## 2022-11-14 PROCEDURE — 43239 EGD BIOPSY SINGLE/MULTIPLE: CPT | Performed by: INTERNAL MEDICINE

## 2022-11-14 RX ORDER — DEXTROSE AND SODIUM CHLORIDE 5; .45 G/100ML; G/100ML
30 INJECTION, SOLUTION INTRAVENOUS CONTINUOUS PRN
Status: DISCONTINUED | OUTPATIENT
Start: 2022-11-14 | End: 2022-11-14 | Stop reason: HOSPADM

## 2022-11-14 RX ORDER — LIDOCAINE HYDROCHLORIDE 20 MG/ML
INJECTION, SOLUTION INTRAVENOUS AS NEEDED
Status: DISCONTINUED | OUTPATIENT
Start: 2022-11-14 | End: 2022-11-14 | Stop reason: SURG

## 2022-11-14 RX ORDER — PROPOFOL 10 MG/ML
VIAL (ML) INTRAVENOUS AS NEEDED
Status: DISCONTINUED | OUTPATIENT
Start: 2022-11-14 | End: 2022-11-14 | Stop reason: SURG

## 2022-11-14 RX ADMIN — PROPOFOL 140 MG: 10 INJECTION, EMULSION INTRAVENOUS at 14:22

## 2022-11-14 RX ADMIN — PROPOFOL 40 MG: 10 INJECTION, EMULSION INTRAVENOUS at 14:26

## 2022-11-14 RX ADMIN — GLYCOPYRROLATE 0.1 MG: 0.2 INJECTION, SOLUTION INTRAMUSCULAR; INTRAVITREAL at 14:19

## 2022-11-14 RX ADMIN — DEXTROSE AND SODIUM CHLORIDE 30 ML/HR: 5; 450 INJECTION, SOLUTION INTRAVENOUS at 13:38

## 2022-11-14 RX ADMIN — PROPOFOL 30 MG: 10 INJECTION, EMULSION INTRAVENOUS at 14:24

## 2022-11-14 RX ADMIN — LIDOCAINE HYDROCHLORIDE 100 MG: 20 INJECTION, SOLUTION INTRAVENOUS at 14:22

## 2022-11-14 NOTE — ANESTHESIA PREPROCEDURE EVALUATION
Anesthesia Evaluation     NPO Solid Status: > 8 hours  NPO Liquid Status: > 4 hours           Airway   Mallampati: III  TM distance: <3 FB  Neck ROM: limited  Possible difficult intubation  Dental - normal exam     Pulmonary - normal exam   (+) pulmonary embolism, shortness of breath,   (-) not a smoker  Cardiovascular   Exercise tolerance: good (4-7 METS)    ECG reviewed  PT is on anticoagulation therapy  Rhythm: regular  Rate: normal    (+) hypertension well controlled less than 2 medications, DVT, hyperlipidemia,     ROS comment: Test Reason : CP  Blood Pressure :   */*   mmHG  Vent. Rate :  76 BPM     Atrial Rate :  76 BPM     P-R Int : 182 ms          QRS Dur :  90 ms      QT Int : 402 ms       P-R-T Axes :  28  -9 -28 degrees     QTc Int : 452 ms     Normal sinus rhythm  Voltage criteria for left ventricular hypertrophy  Inferior infarct , age undetermined  Non Specific T wave abnormality  Abnormal ECG  When compared with ECG of 26-JUL-2020 11:23,  T wave inversion now evident in Inferior leads  Nonspecific T wave abnormality, improved in Lateral leads     Referred By: ERDR           Confirmed By: DIONICIO SALAZAR    Neuro/Psych  (-) seizures, TIA, CVA  GI/Hepatic/Renal/Endo      Musculoskeletal     Abdominal   (+) obese,    Substance History   (+) alcohol use,   (-) drug use     OB/GYN          Other                      Anesthesia Plan    ASA 3     general   total IV anesthesia  intravenous induction     Anesthetic plan, risks, benefits, and alternatives have been provided, discussed and informed consent has been obtained with: patient.    Plan discussed with CRNA.        CODE STATUS:

## 2022-11-14 NOTE — ANESTHESIA POSTPROCEDURE EVALUATION
Patient: Edgardo Ugarte    Procedure Summary     Date: 11/14/22 Room / Location: [facility] ENDOSCOPY 3 / [facility] ENDOSCOPY    Anesthesia Start: 1416 Anesthesia Stop: 1434    Procedures:       ESOPHAGOGASTRODUODENOSCOPY      COLONOSCOPY Diagnosis:       Generalized abdominal pain      Melena      (Generalized abdominal pain [R10.84])      (Melena [K92.1])    Surgeons: Demetri Houston MD Provider: Matilde Mcclendon CRNA    Anesthesia Type: general ASA Status: 3          Anesthesia Type: general    Vitals  No vitals data found for the desired time range.          Post Anesthesia Care and Evaluation    Patient location during evaluation: bedside  Patient participation: complete - patient participated  Level of consciousness: sleepy but conscious  Pain score: 0  Pain management: adequate    Airway patency: patent  Anesthetic complications: No anesthetic complications  PONV Status: none  Cardiovascular status: acceptable and hemodynamically stable  Respiratory status: acceptable  Hydration status: acceptable

## 2022-11-17 LAB — REF LAB TEST METHOD: NORMAL

## 2022-12-13 ENCOUNTER — OFFICE VISIT (OUTPATIENT)
Dept: GASTROENTEROLOGY | Facility: CLINIC | Age: 57
End: 2022-12-13

## 2022-12-13 VITALS
OXYGEN SATURATION: 97 % | HEART RATE: 82 BPM | TEMPERATURE: 96.9 F | SYSTOLIC BLOOD PRESSURE: 162 MMHG | WEIGHT: 310 LBS | BODY MASS INDEX: 37.75 KG/M2 | HEIGHT: 76 IN | DIASTOLIC BLOOD PRESSURE: 103 MMHG

## 2022-12-13 DIAGNOSIS — K21.00 GASTROESOPHAGEAL REFLUX DISEASE WITH ESOPHAGITIS WITHOUT HEMORRHAGE: Primary | ICD-10-CM

## 2022-12-13 DIAGNOSIS — K29.30 CHRONIC SUPERFICIAL GASTRITIS WITHOUT BLEEDING: ICD-10-CM

## 2022-12-13 PROCEDURE — 99213 OFFICE O/P EST LOW 20 MIN: CPT | Performed by: NURSE PRACTITIONER

## 2022-12-13 RX ORDER — PANTOPRAZOLE SODIUM 40 MG/1
40 TABLET, DELAYED RELEASE ORAL DAILY
Qty: 30 TABLET | Refills: 5 | Status: SHIPPED | OUTPATIENT
Start: 2022-12-13

## 2022-12-13 RX ORDER — ALBUTEROL SULFATE 90 UG/1
AEROSOL, METERED RESPIRATORY (INHALATION)
COMMUNITY
Start: 2022-11-16

## 2022-12-13 NOTE — PROGRESS NOTES
Chief Complaint   Patient presents with   • Abdominal Pain       Subjective    Edgardo Ugarte is a 57 y.o. male. he is here today for follow-up.                                                                  Assessment & Plan                                     1. Gastroesophageal reflux disease with esophagitis without hemorrhage    2. Chronic superficial gastritis without bleeding      Plan; we will start patient on PPI daily encouraged to avoid gastric irritants continue to work on weight loss.  Follow-up in GI office as needed.  Repeat colonoscopy due 2027 for surveillance      Follow-up: Return for Recheck, After test.     HPI  57-year-old male presents for follow-up after EGD and colonoscopy.  Denies any abdominal pain change in bowel movements or blood in stool.  States bowel movements have been regular denies any recent episodes of mucus or melena.  Reports bowel movements are 1-3 times per day depending upon his intake.  EGD noted grade 3 esophagitis gastritis and normal duodenum.  Antral biopsy noted chronic gastritis negative for H. pylori metaplasia or dysplasia.  Esophageal biopsy noted reflux related changes negative for eosinophils metaplasia or dysplasia.  Colonoscopy noted external and internal hemorrhoids otherwise normal exam biopsies were collected which noted no significant pathologic abnormality repeat is recommended in 5 years for surveillance.      Review of Systems  Review of Systems   Constitutional: Negative for activity change, appetite change, chills, diaphoresis, fatigue, fever and unexpected weight change.   HENT: Negative for trouble swallowing.    Respiratory: Negative for cough and shortness of breath.    Gastrointestinal: Negative for abdominal distention, abdominal pain, anal bleeding, blood in stool, constipation, diarrhea, nausea, rectal pain and vomiting.  "      BP (!) 162/103 (BP Location: Left arm, Patient Position: Sitting, Cuff Size: Adult)   Pulse 82   Temp 96.9 °F (36.1 °C) (Temporal)   Ht 193 cm (76\")   Wt (!) 141 kg (310 lb)   SpO2 97%   BMI 37.73 kg/m²     Objective      Physical Exam  Constitutional:       General: He is not in acute distress.     Appearance: Normal appearance. He is normal weight. He is not ill-appearing.   HENT:      Head: Normocephalic and atraumatic.   Pulmonary:      Effort: Pulmonary effort is normal.   Abdominal:      General: Abdomen is flat. Bowel sounds are normal. There is no distension.      Palpations: Abdomen is soft. There is no mass.      Tenderness: There is no abdominal tenderness.   Neurological:      Mental Status: He is alert.               The following portions of the patient's history were reviewed and updated as appropriate:   Past Medical History:   Diagnosis Date   • H/O echocardiogram 03/31/2015    Technically a difficult study. Overall LV contractility normal with Ef of 60-65%.Mild LVH and diastolic relaxation abnormality of left ventricle.Mild aortic root dilatation and mild left atrial dilatation.RV mildly dilated   • Hypertension    • Long term (current) use of anticoagulants    • Personal history of PE (pulmonary embolism)    • Pulmonary embolism without acute cor pulmonale (HCC)    • Pulmonary embolus (HCC)    • Secondary hypercoagulability disorder (HCC)      Past Surgical History:   Procedure Laterality Date   • CHOLECYSTECTOMY     • COLONOSCOPY N/A 11/14/2022    Procedure: COLONOSCOPY;  Surgeon: Demetri Houston MD;  Location: [facility] ENDOSCOPY;  Service: Gastroenterology;  Laterality: N/A;   • ENDOSCOPY N/A 11/14/2022    Procedure: ESOPHAGOGASTRODUODENOSCOPY;  Surgeon: Demetri Houston MD;  Location: [facility] ENDOSCOPY;  Service: Gastroenterology;  Laterality: N/A;     Family History   Problem Relation Age of Onset   • Lung cancer Mother    • Diabetes Father    • Heart failure Father    • Lung cancer " Father    • Hypertension Other    • Bleeding Disorder Neg Hx    • Stroke Neg Hx    • Pulmonary embolism Neg Hx        Allergies   Allergen Reactions   • Penicillins Rash     Social History     Socioeconomic History   • Marital status:    Tobacco Use   • Smoking status: Never   • Smokeless tobacco: Never   Vaping Use   • Vaping Use: Never used   Substance and Sexual Activity   • Alcohol use: Yes     Comment: occasional   • Drug use: No   • Sexual activity: Defer     Current Medications:  Prior to Admission medications    Medication Sig Start Date End Date Taking? Authorizing Provider   amLODIPine (NORVASC) 2.5 MG tablet Take 1 tablet by mouth Daily. 10/24/18  Yes Whit Little MD   warfarin (COUMADIN) 7.5 MG tablet Take 1 tablet by mouth Daily. 2/22/19  Yes Princess Mckeon APRN   albuterol sulfate  (90 Base) MCG/ACT inhaler INHALE 1 PUFF BY MOUTH EVERY 4 HOURS AS NEEDED 11/16/22   Provider, MD Luna     Orders placed during this encounter include:  No orders of the defined types were placed in this encounter.    * Surgery not found *  New Medications Ordered This Visit   Medications   • pantoprazole (PROTONIX) 40 MG EC tablet     Sig: Take 1 tablet by mouth Daily.     Dispense:  30 tablet     Refill:  5         Review and/or summary of lab tests, radiology, procedures, medications. Review and summary of old records and obtaining of history. The risks and benefits of my recommendations, as well as other treatment options were discussed . Any questions/concerned were answered. Patient voiced understanding and agreement.          This document has been electronically signed by SALVATORE Moore on December 13, 2022 10:27 CST                                               Results for orders placed or performed during the hospital encounter of 11/14/22   TISSUE EXAM, [facility] ([facility])    Specimen: A: Gastric, Antrum; Tissue    B: Esophagus, Distal; Tissue    C: Small Intestine, Ileum;  Tissue    D: Large Intestine; Tissue   Result Value Ref Range    Reference Lab Report       [facility]  [address] [place] [address]  Phone: [phone] or [phone]  Fax: [phone]  Paul Bolden M.D., Medical Director    PATIENT NAME                           LABORATORY NO.  [address]                    [id number]  [id number]                         AGE              SEX  SSN           CLIENT REF #  [facility]           57      1965      [id number]   [id number]    [place]                       REQUESTING M.D.     ATTENDING M.D.     COPY TO.  [address] [place] [address]                 [place] [address]             DATE COLLECTED      DATE RECEIVED      DATE REPORTED  2022          11/15/2022         2022    DIAGNOSIS:  A.   ANTRUM, BIOPSY:  Chronic gastritis with reactive gastropathy  H. pylori immunohistochemical stain is negative for organisms  No intestinal metaplasia or dysplasia identified  B.   ESOPHAGUS, BIOPSY, DISTAL:  Squamous  mucosa with mild reactive/reflux related changes  No increased intraepithelial eosinophils, intestinal metaplasia or  dysplasia identified  C.   TERMINAL ILEUM BIOPSY:  Small bowel mucosa with no significant pathologic abnormality  D.   COLON, BIOPSY:  Colonic mucosa with no significant pathologic abnormality  J LH/RLL    CLINICAL HISTORY:  Generalized abdominal pain, melena    SPECIMENS RECEIVED:  A.  ANTRUM, BIOPSY  B.  ESOPHAGUS, BIOPSY, DISTAL  C.  TERMINAL ILEUM BIOPSY  D.  COLON, BIOPSY    MICROSCOPIC DESCRIPTION:  Tissue blocks are prepared and slides are examined microscopically on all  specimens. See diagnosis for details.    The internal and external (both positive and negative) controls reacted  appropriately. Some of our immunohistochemical and in situ hybridization  studies are performed as analyte specific reagents. The following  "statement  applies to those tests: This test was developed, and its performance  characteristics determined by [facility]. It  has not been  cleared or approved by the [place] Food and Drug Administration. However, the  FDA has determined that approval and clearance are not necessary.    Professional interpretation rendered by Paul Bolden M.D., JOSEF at  [facility] [place], [address] [place] [address].    GROSS DESCRIPTION:  A.  Specimen is received in 1 formalin filled container labeled \"antrum cold  biopsy\" consists of 3 pieces of tan-gray soft tissue measuring 0.7 x 0.3 x  0.1 cm in aggregate.  All submitted in 1 cassette.  BKO  B.  Specimen is received in 1 formalin filled container labeled \"distal  esophagus cold biopsy\" consists of a single portion of tan-gray soft tissue  measuring 0.4 x 0.3 x 0.2 cm.  All submitted in 1 cassette.  C.  Specimen is received in 1 formalin filled container labeled \"terminal ileum  cold biopsy\" consists of 2 pieces of tan soft tissue measuring 0.9 x 0.3 x  0.2 cm in aggregate.  All submitted in 1 cassette.  D.  Specimen is received in 1 formalin filled container  labeled \"colonic  mucosa biopsy\" consists of 3 pieces of tan-gray soft tissue measuring 0.7 x  0.3 x 0.2 cm in aggregate.  All submitted in 1 cassette.  BKO    REVIEWED, DIAGNOSED AND ELECTRONICALLY  SIGNED BY:    Paul Bolden M.D., ANDRAEAKATHRYN.  CPT CODES:  88305x4, 07496     Results for orders placed or performed during the hospital encounter of 11/08/21   Gold Top - SST   Result Value Ref Range    Extra Tube Hold for add-ons.    Green Top (Gel)   Result Value Ref Range    Extra Tube Hold for add-ons.    CBC Auto Differential    Specimen: Blood   Result Value Ref Range    WBC 8.27 3.40 - 10.80 10*3/mm3    RBC 4.72 4.14 - 5.80 10*6/mm3    Hemoglobin 14.9 13.0 - 17.7 g/dL    Hematocrit 43.3 37.5 - 51.0 %    MCV 91.7 79.0 - 97.0 fL    MCH 31.6 26.6 - 33.0 pg    MCHC 34.4 31.5 - 35.7 g/dL    " RDW 12.7 12.3 - 15.4 %    RDW-SD 42.5 37.0 - 54.0 fl    MPV 10.3 6.0 - 12.0 fL    Platelets 227 140 - 450 10*3/mm3    Neutrophil % 48.0 42.7 - 76.0 %    Lymphocyte % 34.1 19.6 - 45.3 %    Monocyte % 13.9 (H) 5.0 - 12.0 %    Eosinophil % 1.3 0.3 - 6.2 %    Basophil % 0.8 0.0 - 1.5 %    Immature Grans % 1.9 (H) 0.0 - 0.5 %    Neutrophils, Absolute 3.96 1.70 - 7.00 10*3/mm3    Lymphocytes, Absolute 2.82 0.70 - 3.10 10*3/mm3    Monocytes, Absolute 1.15 (H) 0.10 - 0.90 10*3/mm3    Eosinophils, Absolute 0.11 0.00 - 0.40 10*3/mm3    Basophils, Absolute 0.07 0.00 - 0.20 10*3/mm3    Immature Grans, Absolute 0.16 (H) 0.00 - 0.05 10*3/mm3    nRBC 0.0 0.0 - 0.2 /100 WBC   Lavender Top   Result Value Ref Range    Extra Tube hold for add-on    Light Blue Top   Result Value Ref Range    Extra Tube hold for add-on    Troponin    Specimen: Blood   Result Value Ref Range    Troponin T <0.010 0.000 - 0.030 ng/mL   Troponin    Specimen: Blood   Result Value Ref Range    Troponin T <0.010 0.000 - 0.030 ng/mL   Protime-INR    Specimen: Blood   Result Value Ref Range    Protime 21.2 (H) 11.1 - 15.3 Seconds    INR 1.88 (H) 0.80 - 1.20   D-dimer, Quantitative    Specimen: Blood   Result Value Ref Range    D-Dimer, Quantitative <270 0 - 470 ng/mL (FEU)   BNP    Specimen: Blood   Result Value Ref Range    proBNP 51.7 0.0 - 900.0 pg/mL   Lipase    Specimen: Blood   Result Value Ref Range    Lipase 17 13 - 60 U/L   Comprehensive Metabolic Panel    Specimen: Blood   Result Value Ref Range    Glucose 102 (H) 65 - 99 mg/dL    BUN 11 6 - 20 mg/dL    Creatinine 0.93 0.76 - 1.27 mg/dL    Sodium 137 136 - 145 mmol/L    Potassium 3.6 3.5 - 5.2 mmol/L    Chloride 103 98 - 107 mmol/L    CO2 23.0 22.0 - 29.0 mmol/L    Calcium 8.8 8.6 - 10.5 mg/dL    Total Protein 7.2 6.0 - 8.5 g/dL    Albumin 4.60 3.50 - 5.20 g/dL    ALT (SGPT) 30 1 - 41 U/L    AST (SGOT) 27 1 - 40 U/L    Alkaline Phosphatase 70 39 - 117 U/L    Total Bilirubin 1.1 0.0 - 1.2 mg/dL    eGFR  Non African Amer 84 >60 mL/min/1.73    Globulin 2.6 gm/dL    A/G Ratio 1.8 g/dL    BUN/Creatinine Ratio 11.8 7.0 - 25.0    Anion Gap 11.0 5.0 - 15.0 mmol/L   ECG 12 Lead   Result Value Ref Range    QT Interval 402 ms    QTC Interval 452 ms   Results for orders placed or performed during the hospital encounter of 07/26/20   Gold Top - SST   Result Value Ref Range    Extra Tube Hold for add-ons.    Green Top (Gel)   Result Value Ref Range    Extra Tube Hold for add-ons.    Urinalysis, Microscopic Only - Urine, Clean Catch    Specimen: Urine, Clean Catch   Result Value Ref Range    RBC, UA 3-5 (A) None Seen /HPF    WBC, UA 0-2 None Seen, 0-2, 3-5 /HPF    Bacteria, UA None Seen None Seen /HPF    Squamous Epithelial Cells, UA None Seen None Seen, 0-2 /HPF    Hyaline Casts, UA 3-6 None Seen /LPF    Methodology Automated Microscopy    Urinalysis With Microscopic If Indicated (No Culture) - Urine, Clean Catch    Specimen: Urine, Clean Catch   Result Value Ref Range    Color, UA Dark Yellow Yellow, Straw, Dark Yellow, Pauline    Appearance, UA Clear Clear    pH, UA 5.5 5.0 - 9.0    Specific Gravity, UA 1.027 1.003 - 1.030    Glucose, UA Negative Negative    Ketones, UA Trace (A) Negative    Bilirubin, UA Negative Negative    Blood, UA Negative Negative    Protein, UA 30 mg/dL (1+) (A) Negative    Leuk Esterase, UA Negative Negative    Nitrite, UA Negative Negative    Urobilinogen, UA 0.2 E.U./dL 0.2 - 1.0 E.U./dL   CBC Auto Differential    Specimen: Blood   Result Value Ref Range    WBC 12.69 (H) 3.40 - 10.80 10*3/mm3    RBC 4.99 4.14 - 5.80 10*6/mm3    Hemoglobin 15.5 13.0 - 17.7 g/dL    Hematocrit 45.0 37.5 - 51.0 %    MCV 90.2 79.0 - 97.0 fL    MCH 31.1 26.6 - 33.0 pg    MCHC 34.4 31.5 - 35.7 g/dL    RDW 12.5 12.3 - 15.4 %    RDW-SD 40.6 37.0 - 54.0 fl    MPV 10.9 6.0 - 12.0 fL    Platelets 300 140 - 450 10*3/mm3    Neutrophil % 49.5 42.7 - 76.0 %    Lymphocyte % 36.0 19.6 - 45.3 %    Monocyte % 12.3 (H) 5.0 - 12.0 %     Eosinophil % 0.3 0.3 - 6.2 %    Basophil % 0.5 0.0 - 1.5 %    Immature Grans % 1.4 (H) 0.0 - 0.5 %    Neutrophils, Absolute 6.28 1.70 - 7.00 10*3/mm3    Lymphocytes, Absolute 4.57 (H) 0.70 - 3.10 10*3/mm3    Monocytes, Absolute 1.56 (H) 0.10 - 0.90 10*3/mm3    Eosinophils, Absolute 0.04 0.00 - 0.40 10*3/mm3    Basophils, Absolute 0.06 0.00 - 0.20 10*3/mm3    Immature Grans, Absolute 0.18 (H) 0.00 - 0.05 10*3/mm3    nRBC 0.0 0.0 - 0.2 /100 WBC     *Note: Due to a large number of results and/or encounters for the requested time period, some results have not been displayed. A complete set of results can be found in Results Review.

## (undated) DEVICE — BITEBLOCK ENDO W/STRAP 60F A/ LF DISP

## (undated) DEVICE — SINGLE-USE BIOPSY FORCEPS: Brand: RADIAL JAW 4

## (undated) DEVICE — CANN SMPL SOFTECH BIFLO ETCO2 A/M 7FT